# Patient Record
Sex: MALE | Race: WHITE | NOT HISPANIC OR LATINO | Employment: OTHER | URBAN - METROPOLITAN AREA
[De-identification: names, ages, dates, MRNs, and addresses within clinical notes are randomized per-mention and may not be internally consistent; named-entity substitution may affect disease eponyms.]

---

## 2023-05-17 ENCOUNTER — PATIENT OUTREACH (OUTPATIENT)
Dept: CARE COORDINATION | Facility: CLINIC | Age: 73
End: 2023-05-17
Payer: MEDICARE

## 2023-07-05 ENCOUNTER — TELEPHONE (OUTPATIENT)
Dept: PRIMARY CARE | Facility: CLINIC | Age: 73
End: 2023-07-05
Payer: MEDICARE

## 2023-07-05 DIAGNOSIS — R60.9 EDEMA, UNSPECIFIED TYPE: ICD-10-CM

## 2023-07-05 RX ORDER — CHLORTHALIDONE 25 MG/1
25 TABLET ORAL DAILY
COMMUNITY
Start: 2015-10-23 | End: 2023-07-05 | Stop reason: SDUPTHER

## 2023-07-05 RX ORDER — CHLORTHALIDONE 25 MG/1
25 TABLET ORAL DAILY
Qty: 90 TABLET | Refills: 1 | Status: SHIPPED | OUTPATIENT
Start: 2023-07-05 | End: 2023-10-11 | Stop reason: ALTCHOICE

## 2023-07-05 NOTE — TELEPHONE ENCOUNTER
Refills    Chlorthalidone 25 mg 1 daily  Completely out  He has an apt in 0ctober        90 days    [facility]   Please call patient once med has been sent

## 2023-09-21 NOTE — TELEPHONE ENCOUNTER
Received a rx request for niacin and pt is not on this rx vitamin  Pt needs an appt to follow up with his DM Management    I called and left him a msg to callus back to schedule  You can add him onto Dr Ford's schedule if he accepts

## 2023-09-22 ENCOUNTER — DOCUMENTATION (OUTPATIENT)
Dept: PRIMARY CARE | Facility: CLINIC | Age: 73
End: 2023-09-22
Payer: MEDICARE

## 2023-09-25 ENCOUNTER — HOSPITAL ENCOUNTER (OUTPATIENT)
Dept: DATA CONVERSION | Facility: HOSPITAL | Age: 73
End: 2023-09-25
Attending: INTERNAL MEDICINE | Admitting: INTERNAL MEDICINE
Payer: MEDICARE

## 2023-09-25 DIAGNOSIS — Z86.010 PERSONAL HISTORY OF COLONIC POLYPS: ICD-10-CM

## 2023-09-25 DIAGNOSIS — D12.2 BENIGN NEOPLASM OF ASCENDING COLON: ICD-10-CM

## 2023-09-25 DIAGNOSIS — Z12.11 ENCOUNTER FOR SCREENING FOR MALIGNANT NEOPLASM OF COLON: ICD-10-CM

## 2023-09-25 DIAGNOSIS — K64.0 FIRST DEGREE HEMORRHOIDS: ICD-10-CM

## 2023-09-25 DIAGNOSIS — D12.5 BENIGN NEOPLASM OF SIGMOID COLON: ICD-10-CM

## 2023-09-25 LAB — POCT GLUCOSE: 127 MG/DL (ref 74–99)

## 2023-09-29 LAB
COMPLETE PATHOLOGY REPORT: NORMAL
CONVERTED CLINICAL DIAGNOSIS-HISTORY: NORMAL
CONVERTED FINAL DIAGNOSIS: NORMAL
CONVERTED FINAL REPORT PDF LINK TO COPY AND PASTE: NORMAL
CONVERTED GROSS DESCRIPTION: NORMAL

## 2023-10-04 ENCOUNTER — TELEPHONE (OUTPATIENT)
Dept: PRIMARY CARE | Facility: CLINIC | Age: 73
End: 2023-10-04
Payer: MEDICARE

## 2023-10-10 ENCOUNTER — APPOINTMENT (OUTPATIENT)
Dept: PRIMARY CARE | Facility: CLINIC | Age: 73
End: 2023-10-10
Payer: MEDICARE

## 2023-10-11 ENCOUNTER — OFFICE VISIT (OUTPATIENT)
Dept: PRIMARY CARE | Facility: CLINIC | Age: 73
End: 2023-10-11
Payer: MEDICARE

## 2023-10-11 VITALS
SYSTOLIC BLOOD PRESSURE: 98 MMHG | DIASTOLIC BLOOD PRESSURE: 58 MMHG | TEMPERATURE: 97.3 F | WEIGHT: 191 LBS | BODY MASS INDEX: 28.29 KG/M2 | HEART RATE: 74 BPM | HEIGHT: 69 IN | OXYGEN SATURATION: 98 %

## 2023-10-11 DIAGNOSIS — E11.9 TYPE 2 DIABETES MELLITUS WITHOUT COMPLICATION, WITHOUT LONG-TERM CURRENT USE OF INSULIN (MULTI): ICD-10-CM

## 2023-10-11 DIAGNOSIS — J45.20 MILD INTERMITTENT ASTHMA, UNSPECIFIED WHETHER COMPLICATED (HHS-HCC): ICD-10-CM

## 2023-10-11 DIAGNOSIS — I48.91 ATRIAL FIBRILLATION, UNSPECIFIED TYPE (MULTI): ICD-10-CM

## 2023-10-11 DIAGNOSIS — Z00.00 WELLNESS EXAMINATION: Primary | ICD-10-CM

## 2023-10-11 DIAGNOSIS — E55.9 VITAMIN D DEFICIENCY: ICD-10-CM

## 2023-10-11 DIAGNOSIS — E78.5 HYPERLIPIDEMIA, UNSPECIFIED HYPERLIPIDEMIA TYPE: ICD-10-CM

## 2023-10-11 DIAGNOSIS — I10 HYPERTENSION, UNSPECIFIED TYPE: ICD-10-CM

## 2023-10-11 DIAGNOSIS — R97.20 ELEVATED PROSTATE SPECIFIC ANTIGEN LESS THAN 10 NG/ML: ICD-10-CM

## 2023-10-11 LAB — POC HEMOGLOBIN A1C: 7.8 % (ref 4.2–6.5)

## 2023-10-11 PROCEDURE — 1126F AMNT PAIN NOTED NONE PRSNT: CPT | Performed by: INTERNAL MEDICINE

## 2023-10-11 PROCEDURE — 1160F RVW MEDS BY RX/DR IN RCRD: CPT | Performed by: INTERNAL MEDICINE

## 2023-10-11 PROCEDURE — 83036 HEMOGLOBIN GLYCOSYLATED A1C: CPT | Performed by: INTERNAL MEDICINE

## 2023-10-11 PROCEDURE — 1159F MED LIST DOCD IN RCRD: CPT | Performed by: INTERNAL MEDICINE

## 2023-10-11 PROCEDURE — 99497 ADVNCD CARE PLAN 30 MIN: CPT | Performed by: INTERNAL MEDICINE

## 2023-10-11 PROCEDURE — 1036F TOBACCO NON-USER: CPT | Performed by: INTERNAL MEDICINE

## 2023-10-11 PROCEDURE — G0439 PPPS, SUBSEQ VISIT: HCPCS | Performed by: INTERNAL MEDICINE

## 2023-10-11 PROCEDURE — 3074F SYST BP LT 130 MM HG: CPT | Performed by: INTERNAL MEDICINE

## 2023-10-11 PROCEDURE — 4010F ACE/ARB THERAPY RXD/TAKEN: CPT | Performed by: INTERNAL MEDICINE

## 2023-10-11 PROCEDURE — 99397 PER PM REEVAL EST PAT 65+ YR: CPT | Performed by: INTERNAL MEDICINE

## 2023-10-11 PROCEDURE — 1170F FXNL STATUS ASSESSED: CPT | Performed by: INTERNAL MEDICINE

## 2023-10-11 PROCEDURE — 3078F DIAST BP <80 MM HG: CPT | Performed by: INTERNAL MEDICINE

## 2023-10-11 RX ORDER — ATORVASTATIN CALCIUM 20 MG/1
20 TABLET, FILM COATED ORAL DAILY
COMMUNITY
End: 2023-10-24 | Stop reason: SDUPTHER

## 2023-10-11 RX ORDER — FINASTERIDE 5 MG/1
1 TABLET, FILM COATED ORAL DAILY
COMMUNITY
Start: 2012-08-23 | End: 2023-10-24 | Stop reason: SDUPTHER

## 2023-10-11 RX ORDER — METFORMIN HYDROCHLORIDE 500 MG/1
1000 TABLET ORAL
COMMUNITY
End: 2023-10-11 | Stop reason: SDUPTHER

## 2023-10-11 RX ORDER — APIXABAN 5 MG/1
1 TABLET, FILM COATED ORAL 2 TIMES DAILY
COMMUNITY
Start: 2014-07-03 | End: 2023-10-24 | Stop reason: SDUPTHER

## 2023-10-11 RX ORDER — BLOOD SUGAR DIAGNOSTIC
STRIP MISCELLANEOUS
COMMUNITY
End: 2023-10-24 | Stop reason: SDUPTHER

## 2023-10-11 RX ORDER — LOSARTAN POTASSIUM 100 MG/1
1 TABLET ORAL DAILY
COMMUNITY
Start: 2013-01-28 | End: 2023-10-24 | Stop reason: SDUPTHER

## 2023-10-11 RX ORDER — METFORMIN HYDROCHLORIDE 1000 MG/1
1000 TABLET ORAL
Qty: 180 TABLET | Refills: 3 | Status: SHIPPED | OUTPATIENT
Start: 2023-10-11 | End: 2023-10-24 | Stop reason: SDUPTHER

## 2023-10-11 ASSESSMENT — PATIENT HEALTH QUESTIONNAIRE - PHQ9
2. FEELING DOWN, DEPRESSED OR HOPELESS: NOT AT ALL
1. LITTLE INTEREST OR PLEASURE IN DOING THINGS: NOT AT ALL
SUM OF ALL RESPONSES TO PHQ9 QUESTIONS 1 AND 2: 0
1. LITTLE INTEREST OR PLEASURE IN DOING THINGS: NOT AT ALL
SUM OF ALL RESPONSES TO PHQ9 QUESTIONS 1 AND 2: 0
2. FEELING DOWN, DEPRESSED OR HOPELESS: NOT AT ALL

## 2023-10-11 ASSESSMENT — ACTIVITIES OF DAILY LIVING (ADL)
TAKING_MEDICATION: INDEPENDENT
GROCERY_SHOPPING: INDEPENDENT
BATHING: INDEPENDENT
MANAGING_FINANCES: INDEPENDENT
DRESSING: INDEPENDENT
DOING_HOUSEWORK: INDEPENDENT

## 2023-10-11 NOTE — PROGRESS NOTES
"Subjective   Patient ID: Jett Shelton is a 73 y.o. male who presents for the following    MEDICARE [facility]LL[facility] PHYSICAL 10/11/2023  Germania rodrigues is his sister and POA   Assessment/Plan   A-fib: stable on eliquis 5mg bid patient follows with Dr Alejandra     Htn: stable   Chlorthalidone 25mg daily (stop given )   Losartan 100mg daily     Hld: stable on atorvastatin 20mg daily     DM2: stable. A1c 7.8 10/11/23  Morning sugars are  Jardiance 25mg daily   Metformin 1000mg daily     Asthma: stable,     Colonoscopy with Dr TYLER, 2022 3 YEAR FOLLOW UP (mother  of colon ca)    Prevnar 20 deferred    Dr Lim monitors PSA    Patient is full code at this time. Has living will. Needs to perhaps get a second POA after his sister.     Advanced care planning was discussed for 20 mins  DIscussed in details the options of advanced directives with patient in a voluntary nature. We discussed the differences between full code, comfort care arrest and comfort care. Patient was educated in detail regarding end-of-life care including options for hospice and palliative care. I provided details to patient regarding the importance of creating both a Living Will and Power of  documents and encouraged patient to complete these documents.      HPI  Male with htn, a-fib, hld, DM2 comes for the following    HTN: patient denies any headaches, blurred vision or dizziness. patient denies any stroke like symptoms    HLD: Patient denies any muscle aches and is tolerating statin therapy    Diabetes Type 2: patient denies any low blood sugars. Patient is following with opthalmology on a yearly basis. Patient is taking metformin and jardiance     social: patient denies any smoking, drug or alcohol abuse    family history:    surgical history:    Visit Vitals  Pulse 74   Temp 36.3 °C (97.3 °F)   Ht 1.753 m (5' 9\")   Wt 85 kg (187 lb 6.4 oz)   SpO2 98%   BMI 27.67 kg/m²   Smoking Status Former   BSA 2.03 m²     PHYSICAL EXAM "   General appearance: Alert and in no acute distress. speech is clear and coherent  HEENT: Sclera and conjunctiva white, EOMI, uvela midline, no mouth lesions. PERRLA,  nasal turbinates are not swollen without exudate. TM's Pichardo with cone of light, external ear canal with scant cerumen. No head trauma  Neck: no carotid bruits or thyromegaly. no lymphadenopathy   Respiratory : No respiratory distress, normal respiratory rhythm and effort. Clear bilateral breath sounds. No wheezing or rhonchi.   Cardiovascular: heart rate regular, S1, S2. no murmurs. no Lower extremity edema  Skin inspection: Normal skin color and pigmentation, normal skin turgor and no visible rash, induration, or cellulitis  MSK: 5/5 strength upper and lower extremities without gait abnormalities. no loss of muscle mass   Neuro: 2-12 CN grossly intact.  no slurred speech. no lateralizing deficit  Psychiatric Orientation: Oriented to person, place, and time. no depression, homicidal or suicidal thoughts, normal affect  Abdomen: soft, none tender, none distended. no organomegaly      REVIEW OF SYSTEMS   Constitutional: not feeling tired and no fever, chills or sweats. Denies weight loss    HEENT: no earache and no sore throat. no blurred vision and or double vision. no headache  Cardiovascular: no exertional chest pain, no palpitations, no lower extremity edema and no intermittent leg claudication.   Lungs: Denies shortness of breath, exertional dyspnea, wheezing  Gastrointestinal: no change in bowel habits, no diarrhea, no nausea, no vomiting and no abdominal pain. Denies Melena, brbpr or dark stool  Musculoskeletal: no myalgias, no muscle weakness and no limb swelling.   Skin: no rashes, no change in skin color and pigmentation and no skin lumps.   Neurological: no headaches, no seizures, no numbness, no lateralizing deficits and no fainting.   Psychiatric: no depression and no anxiety.   Urine: denies polyuria, hematuria, dysuria   Endocrine: no  cold intolerance, no heat intolerance      No Known Allergies    Current Outpatient Medications   Medication Sig Dispense Refill    atorvastatin (Lipitor) 20 mg tablet Take 1 tablet (20 mg) by mouth once daily. as directed      chlorthalidone (Hygroton) 25 mg tablet Take 1 tablet (25 mg) by mouth once daily. 90 tablet 1    Eliquis 5 mg tablet Take 1 tablet (5 mg) by mouth 2 times a day.      empagliflozin (Jardiance) 25 mg 1 tablet (25 mg).      finasteride (Proscar) 5 mg tablet Take 1 tablet (5 mg) by mouth once daily.      losartan (Cozaar) 100 mg tablet Take 1 tablet (100 mg) by mouth once daily.      metFORMIN (Glucophage) 500 mg tablet 2 tablets (1,000 mg) 2 times a day with meals.      OneTouch Ultra Test strip USE TO CHECK BLOOD SUGAR DAILY       No current facility-administered medications for this visit.       Objective     No visits with results within 4 Month(s) from this visit.   Latest known visit with results is:   Legacy Encounter on 10/17/2022   Component Date Value Ref Range Status    Color, Urine 10/17/2022 YELLOW  STRAW,YELLOW Final    Appearance, Urine 10/17/2022 CLEAR  CLEAR Final    Specific Gravity, Urine 10/17/2022 1.027  1.005 - 1.035 Final    pH, Urine 10/17/2022 6.0  5.0 - 8.0 Final    Protein, Urine 10/17/2022 NEGATIVE  NEGATIVE mg/dL Final    Glucose, Urine 10/17/2022 >=500 (3+) (A)  NEGATIVE mg/dL Final    Blood, Urine 10/17/2022 NEGATIVE  NEGATIVE Final    Ketones, Urine 10/17/2022 NEGATIVE  NEGATIVE mg/dL Final    Bilirubin, Urine 10/17/2022 NEGATIVE  NEGATIVE Final    Urobilinogen, Urine 10/17/2022 <2.0  0.0 - 1.9 mg/dL Final    Nitrite, Urine 10/17/2022 NEGATIVE  NEGATIVE Final    Leukocyte Esterase, Urine 10/17/2022 NEGATIVE  NEGATIVE Final    Cholesterol 10/17/2022 140  0 - 199 mg/dL Final    HDL 10/17/2022 44.2  mg/dL Final    Cholesterol/HDL Ratio 10/17/2022 3.2   Final    LDL 10/17/2022 51  0 - 99 mg/dL Final    VLDL 10/17/2022 45 (H)  0 - 40 mg/dL Final    Triglycerides  10/17/2022 223 (H)  0 - 149 mg/dL Final    Non HDL Cholesterol 10/17/2022 96  mg/dL Final    Hemoglobin A1C 10/17/2022 7.1 (A)  % Final    Estimated Average Glucose 10/17/2022 157  MG/DL Final    Glucose 10/17/2022 114 (H)  74 - 99 mg/dL Final    Sodium 10/17/2022 140  136 - 145 mmol/L Final    Potassium 10/17/2022 3.8  3.5 - 5.3 mmol/L Final    Chloride 10/17/2022 97 (L)  98 - 107 mmol/L Final    Bicarbonate 10/17/2022 34 (H)  21 - 32 mmol/L Final    Anion Gap 10/17/2022 13  10 - 20 mmol/L Final    Urea Nitrogen 10/17/2022 21  6 - 23 mg/dL Final    Creatinine 10/17/2022 0.93  0.50 - 1.30 mg/dL Final    GFR MALE 10/17/2022 86  >90 mL/min/1.73m2 Final    Calcium 10/17/2022 10.5  8.6 - 10.6 mg/dL Final       Radiology: Reviewed imaging in powerchart.  Colonoscopy    Result Date: 9/25/2023  Patient Name: Jett Shelton Procedure Date: 9/25/2023 12:00 PM MRN: [id number] Account Number: [id number] YOB: 1950 Admit Type: Outpatient Site: [facility] Endoscopy Room 1 Ethnicity: Not  or  Race: White Attending MD: Aakash Clark MD, [id number] Procedure:             Colonoscopy Indications:           Surveillance: Personal history of adenomatous polyps                        on last colonoscopy 3 years ago Patient Profile:       Refer to note in patient chart for documentation of                        history and physical. Providers:             Aakash Clark MD (Doctor), Fiordaliza Roy, RN (Nurse) ,                        Rupal Cabrera RN (Nurse) , Araceli Prieto, Technician Referring:             Usman Montalvo DO Medicines:             Monitored Anesthesia Care Complications:         No immediate complications. Estimated blood loss:                        Minimal. Procedure:             Pre-Anesthesia Assessment:                        - Prior to the procedure, a History and Physical was                        performed, and patient medications and allergies were                        reviewed. The patient  is competent. The risks and                        benefits of the procedure and the sedation options and                        risks were discussed with the patient. All questions                        were answered and informed consent was obtained.                        Patient identification and proposed procedure were                        verified by the physician, the nurse and the                        anesthetist in the pre-procedure area. Mental Status                        Examination: alert and oriented. Airway Examination:                        Mallampati Class II (the uvula but not tonsillar                        pillars visualized). Respiratory Examination: clear to                        auscultation. Prophylactic Antibiotics: The patient                        does not require prophylactic antibiotics. Prior                        Anticoagulants: The patient has taken Eliquis                        (apixaban), last dose was 2 days prior to procedure.                        ASA Grade Assessment: III - A patient with severe                        systemic disease. After reviewing the risks and                        benefits, the patient was deemed in satisfactory                        condition to undergo the procedure. The anesthesia                        plan was to use monitored anesthesia care (MAC).                        Immediately prior to administration of medications,                        the patient was re-assessed for adequacy to receive                        sedatives. The heart rate, respiratory rate, oxygen                        saturations, blood pressure, adequacy of pulmonary                        ventilation, and response to care were monitored                        throughout the procedure. The physical status of the                        patient was re-assessed after the procedure.                        After I obtained informed consent, the scope was                         passed under direct vision. Throughout the procedure,                        the patient's blood pressure, pulse, and oxygen                        saturations were monitored continuously. The adult                        colonoscope was introduced through the anus and                        advanced to the terminal ileum, with identification of                        the appendiceal orifice and IC valve. The colonoscopy                        was performed without difficulty. The patient                        tolerated the procedure well. The quality of the bowel                        preparation was evaluated using the BBPS ([place] Bowel                        Preparation Scale) with scores of: Right Colon = 2                        (minor amount of residual staining, small fragments of                        stool and/or opaque liquid, but mucosa seen well),                        Transverse Colon = 3 (entire mucosa seen well with no                        residual staining, small fragments of stool or opaque                        liquid) and Left Colon = 3 (entire mucosa seen well                        with no residual staining, small fragments of stool or                        opaque liquid). The total BBPS score equals 8. The                        quality of the bowel preparation was good. The                        terminal ileum, ileocecal valve, appendiceal orifice,                        and rectum were photographed. Findings:      The perianal and digital rectal examinations were normal.      Three sessile polyps were found in the transverse colon and ascending      colon. The polyps were 5 to 7 mm in size. These polyps were removed with      a cold snare. Resection and retrieval were complete. The pathology      specimen was placed into Bottle A. Estimated blood loss was minimal.      Two sessile polyps were found in the sigmoid colon. The polyps were 5 to      7 mm in size. These  polyps were removed with a cold snare. Resection and      retrieval were complete. The pathology specimen was placed into Bottle      B. Estimated blood loss was minimal.      Non-bleeding internal hemorrhoids were found during retroflexion. The      hemorrhoids were small and Grade I (internal hemorrhoids that do not      prolapse).      The exam was otherwise normal throughout the examined colon.      The terminal ileum appeared normal. Estimated Blood Loss:      Estimated blood loss was minimal. Impression:            - Three 5 to 7 mm polyps in the transverse colon and                        in the ascending colon, removed with a cold snare.                        Resected and retrieved.                        - Two 5 to 7 mm polyps in the sigmoid colon, removed                        with a cold snare. Resected and retrieved.                        - Non-bleeding internal hemorrhoids.                        - The examined portion of the ileum was normal. Recommendation:        - Patient has a contact number available for                        emergencies. The signs and symptoms of potential                        delayed complications were discussed with the patient.                        Return to normal activities tomorrow. Written                        discharge instructions were provided to the patient.                        - Resume previous diet.                        - Continue present medications.                        - Await pathology results.                        - Repeat colonoscopy in 3 years for surveillance.                        - Resume Eliquis (apixaban) at prior dose tomorrow. Procedure Code(s):     --- Professional ---                        59131, Colonoscopy, flexible; with removal of                        tumor(s), polyp(s), or other lesion(s) by snare                        technique                        --- Technical ---                        68892, Colonoscopy, flexible;  with removal of                        tumor(s), polyp(s), or other lesion(s) by snare                        technique Diagnosis Code(s):     --- Professional ---                        Z12.11, Encounter for screening for malignant neoplasm                        of colon                        Z86.010, Personal history of colonic polyps                        D12.3, Benign neoplasm of transverse colon (hepatic                        flexure or splenic flexure)                        D12.2, Benign neoplasm of ascending colon                        D12.5, Benign neoplasm of sigmoid colon                        K64.0, First degree hemorrhoids                        --- Technical ---                        Z12.11, Encounter for screening for malignant neoplasm                        of colon                        Z86.010, Personal history of colonic polyps                        D12.3, Benign neoplasm of transverse colon (hepatic                        flexure or splenic flexure)                        D12.2, Benign neoplasm of ascending colon                        D12.5, Benign neoplasm of sigmoid colon                        K64.0, First degree hemorrhoids CPT copyright 2021 [employer]. All rights reserved. The codes documented in this report are preliminary and upon  review may be revised to meet current compliance requirements. Attending Participation:      I personally performed the entire procedure. Aakash Clark MD 9/25/2023 1:09:51 PM This report has been signed electronically. Number of Addenda: 0 Note Initiated On: 9/25/2023 12:00 PM Scope Withdrawal Time 0 hours 11 minutes 17 seconds Total Procedure Duration Time 0 hours 13 minutes 35 seconds       No family history on file.  Social History     Socioeconomic History    Marital status: Single     Spouse name: None    Number of children: None    Years of education: None    Highest education level: None   Occupational History    None    Tobacco Use    Smoking status: Former     Types: Cigarettes     Quit date: 2013     Years since quitting: 10.7    Smokeless tobacco: Never   Substance and Sexual Activity    Alcohol use: Yes     Comment: occassionally    Drug use: Never    Sexual activity: None   Other Topics Concern    None   Social History Narrative    None     Social Determinants of Health     Financial Resource Strain: Not on file   Food Insecurity: Not on file   Transportation Needs: Not on file   Physical Activity: Not on file   Stress: Not on file   Social Connections: Not on file   Intimate Partner Violence: Not on file   Housing Stability: Not on file     Past Medical History:   Diagnosis Date    Disorder of the skin and subcutaneous tissue, unspecified 11/14/2017    Skin lesion of back    Essential (primary) hypertension 12/04/2013    Benign essential hypertension    Pain in unspecified foot 05/22/2015    Foot pain    Personal history of other diseases of male genital organs     History of benign prostatic hypertrophy    Personal history of other diseases of the circulatory system 09/28/2018    History of cardiomyopathy    Personal history of other endocrine, nutritional and metabolic disease     History of type 2 diabetes mellitus    Personal history of other endocrine, nutritional and metabolic disease     History of hyperlipidemia    Personal history of other specified conditions     History of headache    Strain of muscle, fascia and tendon of abdomen, initial encounter 05/25/2016    Abdominal muscle strain    Unspecified asthma, uncomplicated 01/20/2022    Asthma, allergic    Unspecified visual disturbance 05/22/2015    Vision changes     No past surgical history on file.    Charting was completed using voice recognition technology and may include unintended errors.

## 2023-10-24 DIAGNOSIS — I1A.0 RESISTANT HYPERTENSION: ICD-10-CM

## 2023-10-24 DIAGNOSIS — N40.0 BENIGN PROSTATIC HYPERPLASIA WITHOUT LOWER URINARY TRACT SYMPTOMS: Primary | ICD-10-CM

## 2023-10-24 DIAGNOSIS — E11.9 TYPE 2 DIABETES MELLITUS WITHOUT COMPLICATION, WITHOUT LONG-TERM CURRENT USE OF INSULIN (MULTI): ICD-10-CM

## 2023-10-24 DIAGNOSIS — E78.5 HYPERLIPIDEMIA, UNSPECIFIED HYPERLIPIDEMIA TYPE: ICD-10-CM

## 2023-10-24 RX ORDER — METFORMIN HYDROCHLORIDE 1000 MG/1
1000 TABLET ORAL
Qty: 180 TABLET | Refills: 3 | Status: SHIPPED | OUTPATIENT
Start: 2023-10-24

## 2023-10-24 RX ORDER — APIXABAN 5 MG/1
5 TABLET, FILM COATED ORAL 2 TIMES DAILY
Qty: 90 TABLET | Refills: 3 | Status: SHIPPED | OUTPATIENT
Start: 2023-10-24

## 2023-10-24 RX ORDER — FINASTERIDE 5 MG/1
5 TABLET, FILM COATED ORAL DAILY
Qty: 90 TABLET | Refills: 3 | Status: SHIPPED | OUTPATIENT
Start: 2023-10-24

## 2023-10-24 RX ORDER — ATORVASTATIN CALCIUM 20 MG/1
20 TABLET, FILM COATED ORAL DAILY
Qty: 90 TABLET | Refills: 3 | Status: SHIPPED | OUTPATIENT
Start: 2023-10-24

## 2023-10-24 RX ORDER — LOSARTAN POTASSIUM 100 MG/1
100 TABLET ORAL DAILY
Qty: 90 TABLET | Refills: 3 | Status: SHIPPED | OUTPATIENT
Start: 2023-10-24

## 2023-10-24 RX ORDER — BLOOD SUGAR DIAGNOSTIC
STRIP MISCELLANEOUS
Qty: 3 EACH | Refills: 3 | Status: SHIPPED | OUTPATIENT
Start: 2023-10-24

## 2023-11-20 ENCOUNTER — TELEPHONE (OUTPATIENT)
Dept: PRIMARY CARE | Facility: CLINIC | Age: 73
End: 2023-11-20
Payer: MEDICARE

## 2023-11-20 NOTE — TELEPHONE ENCOUNTER
Pt was getting an rx for metoprolol 25 mg from his previous pcp and ran out. Requesting a refill sent to [facility].

## 2023-11-20 NOTE — TELEPHONE ENCOUNTER
"It's not on his current or past med lists with us. He will need a virtual or in person appt for a \"new med.\" He cn make it with Jessica or Dr mera if Jessica is not avialble  "

## 2023-11-29 ENCOUNTER — OFFICE VISIT (OUTPATIENT)
Dept: PRIMARY CARE | Facility: CLINIC | Age: 73
End: 2023-11-29
Payer: MEDICARE

## 2023-11-29 VITALS
BODY MASS INDEX: 29.3 KG/M2 | WEIGHT: 197.8 LBS | SYSTOLIC BLOOD PRESSURE: 163 MMHG | DIASTOLIC BLOOD PRESSURE: 74 MMHG | HEART RATE: 65 BPM | OXYGEN SATURATION: 93 % | HEIGHT: 69 IN

## 2023-11-29 DIAGNOSIS — E11.9 TYPE 2 DIABETES MELLITUS WITHOUT COMPLICATION, WITHOUT LONG-TERM CURRENT USE OF INSULIN (MULTI): ICD-10-CM

## 2023-11-29 DIAGNOSIS — I10 HYPERTENSION, UNSPECIFIED TYPE: Primary | ICD-10-CM

## 2023-11-29 DIAGNOSIS — G62.9 NEUROPATHY: ICD-10-CM

## 2023-11-29 PROCEDURE — 1126F AMNT PAIN NOTED NONE PRSNT: CPT | Performed by: INTERNAL MEDICINE

## 2023-11-29 PROCEDURE — 99214 OFFICE O/P EST MOD 30 MIN: CPT | Performed by: INTERNAL MEDICINE

## 2023-11-29 PROCEDURE — 1036F TOBACCO NON-USER: CPT | Performed by: INTERNAL MEDICINE

## 2023-11-29 PROCEDURE — 3077F SYST BP >= 140 MM HG: CPT | Performed by: INTERNAL MEDICINE

## 2023-11-29 PROCEDURE — 1160F RVW MEDS BY RX/DR IN RCRD: CPT | Performed by: INTERNAL MEDICINE

## 2023-11-29 PROCEDURE — 1159F MED LIST DOCD IN RCRD: CPT | Performed by: INTERNAL MEDICINE

## 2023-11-29 PROCEDURE — 4010F ACE/ARB THERAPY RXD/TAKEN: CPT | Performed by: INTERNAL MEDICINE

## 2023-11-29 PROCEDURE — 3078F DIAST BP <80 MM HG: CPT | Performed by: INTERNAL MEDICINE

## 2023-11-29 RX ORDER — POTASSIUM CHLORIDE 600 MG/1
8 CAPSULE, EXTENDED RELEASE ORAL 2 TIMES DAILY
COMMUNITY
Start: 2023-10-23 | End: 2024-03-07 | Stop reason: SDUPTHER

## 2023-11-29 NOTE — PROGRESS NOTES
"Subjective   Patient ID: Jett Shelton is a 73 y.o. male who presents for the following    [facility] 10/11/2023  Germania rodrigues is his sister and POA   Assessment/Plan   A-fib: stable on eliquis 5mg bid patient follows with Dr Alejandra   Will hold on metoprolol (patient not taking it for several days and HR is in the 60s)    Htn: stable   Re-start Chlorthalidone @ 1/2 25mg daily (originally stopped given  at encounter 10/11/23) , patient may increase to full tablet if blood pressure still not at 130/80 goal    Continue on Losartan 100mg daily   Repeat bmp in a couple of week     Hld: stable on atorvastatin 20mg daily     DM2: stable. 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 Has living will. Needs to perhaps get a second POA after his sister.          HPI  Male with htn, a-fib, hld, DM2 comes for the following    HTN: patient denies any headaches, blurred vision or dizziness. patient denies any stroke like symptoms. Patient's bp is elevated    A-fib: patient was told to stop metoprolol by MA. Unclear why. Patient is doing well and HR in the 60s. No palpitations. He has not taken the medication for several days now.     HLD: Patient denies any muscle aches and is tolerating statin therapy    Diabetes Type 2: patient denies any low blood sugars. Patient is following with opthalmology on a yearly basis. Patient is taking metformin and jardiance     social: patient denies any smoking, drug or alcohol abuse       Visit Vitals  /74   Pulse 65   Ht 1.753 m (5' 9\")   Wt 89.7 kg (197 lb 12.8 oz)   SpO2 93%   BMI 29.21 kg/m²   Smoking Status Former   BSA 2.09 m²     PHYSICAL EXAM   General appearance: Alert and in no acute " distress. speech is clear and coherent  HEENT: Sclera and conjunctiva white, EOMI,     Respiratory : No respiratory distress, normal respiratory rhythm and effort. Clear bilateral breath sounds. No wheezing or rhonchi.   Cardiovascular: heart rate regular, S1, S2. no murmurs. no Lower extremity edema  Skin inspection: Normal skin color and pigmentation, normal skin turgor and no visible rash, induration, or cellulitis  MSK: 5/5 strength upper and lower extremities without gait abnormalities. no loss of muscle mass   Neuro: 2-12 CN grossly intact.  no slurred speech. no lateralizing deficit  Psychiatric Orientation: Oriented to person, place, and time. no depression, homicidal or suicidal thoughts, normal affect       REVIEW OF SYSTEMS   Constitutional: not feeling tired and no fever, chills or sweats. Denies weight loss, no headache  Cardiovascular: no exertional chest pain, no palpitations, no lower extremity edema    Lungs: Denies shortness of breath, exertional dyspnea, wheezing  Gastrointestinal: no change in bowel habits, no diarrhea, no nausea, no vomiting and no abdominal pain.   Musculoskeletal: no myalgias, no muscle weakness and no limb swelling.   Skin: no rashes, no change in skin color and pigmentation and no skin lumps.   Neurological: no headaches, no seizures, no numbness, no lateralizing deficits and no fainting.   Psychiatric: no depression and no anxiety.   Urine: denies polyuria, hematuria, dysuria        No Known Allergies    Current Outpatient Medications   Medication Sig Dispense Refill    atorvastatin (Lipitor) 20 mg tablet Take 1 tablet (20 mg) by mouth once daily. as directed 90 tablet 3    Eliquis 5 mg tablet Take 1 tablet (5 mg) by mouth 2 times a day. 90 tablet 3    empagliflozin (Jardiance) 25 mg Take 1 tablet (25 mg) by mouth once daily. 90 tablet 3    finasteride (Proscar) 5 mg tablet Take 1 tablet (5 mg) by mouth once daily. 90 tablet 3    losartan (Cozaar) 100 mg tablet Take 1  tablet (100 mg) by mouth once daily. 90 tablet 3    metFORMIN (Glucophage) 1,000 mg tablet Take 1 tablet (1,000 mg) by mouth 2 times a day with meals. 180 tablet 3    metoprolol succinate XL (Toprol-XL) 25 mg 24 hr tablet TAKE 1 TABLET BY MOUTH EVERY DAY 90 tablet 3    OneTouch Ultra Test strip USE TO CHECK BLOOD SUGAR DAILY 3 each 3    potassium chloride ER (Micro-K) 8 mEq ER capsule Take 1 capsule (8 mEq) by mouth 2 times a day.       No current facility-administered medications for this visit.       Objective     Office Visit on 10/11/2023   Component Date Value Ref Range Status    POC HEMOGLOBIN A1c 10/11/2023 7.8 (A)  4.2 - 6.5 % Final       Radiology: Reviewed imaging in powerchart.  Colonoscopy    Result Date: 9/25/2023  Patient Name: Jett Shelton Procedure Date: 9/25/2023 12:00 PM MRN: [id number] Account Number: [id number] YOB: 1950 Admit Type: Outpatient Site: [facility] Endoscopy Room 1 Ethnicity: Not  or  Race: White Attending MD: Aakash Clark MD, [id number] Procedure:             Colonoscopy Indications:           Surveillance: Personal history of adenomatous polyps                        on last colonoscopy 3 years ago Patient Profile:       Refer to note in patient chart for documentation of                        history and physical. Providers:             Aakash Clark MD (Doctor), Fiordaliza Roy RN (Nurse) ,                        Rupal Cabrera, GREGORIO (Nurse) , Araceli Prieto, Technician Referring:             Usman Montalvo DO Medicines:             Monitored Anesthesia Care Complications:         No immediate complications. Estimated blood loss:                        Minimal. Procedure:             Pre-Anesthesia Assessment:                        - Prior to the procedure, a History and Physical was                        performed, and patient medications and allergies were                        reviewed. The patient is competent. The risks and                        benefits of  the procedure and the sedation options and                        risks were discussed with the patient. All questions                        were answered and informed consent was obtained.                        Patient identification and proposed procedure were                        verified by the physician, the nurse and the                        anesthetist in the pre-procedure area. Mental Status                        Examination: alert and oriented. Airway Examination:                        Mallampati Class II (the uvula but not tonsillar                        pillars visualized). Respiratory Examination: clear to                        auscultation. Prophylactic Antibiotics: The patient                        does not require prophylactic antibiotics. Prior                        Anticoagulants: The patient has taken Eliquis                        (apixaban), last dose was 2 days prior to procedure.                        ASA Grade Assessment: III - A patient with severe                        systemic disease. After reviewing the risks and                        benefits, the patient was deemed in satisfactory                        condition to undergo the procedure. The anesthesia                        plan was to use monitored anesthesia care (MAC).                        Immediately prior to administration of medications,                        the patient was re-assessed for adequacy to receive                        sedatives. The heart rate, respiratory rate, oxygen                        saturations, blood pressure, adequacy of pulmonary                        ventilation, and response to care were monitored                        throughout the procedure. The physical status of the                        patient was re-assessed after the procedure.                        After I obtained informed consent, the scope was                        passed under direct vision. Throughout the  procedure,                        the patient's blood pressure, pulse, and oxygen                        saturations were monitored continuously. The adult                        colonoscope was introduced through the anus and                        advanced to the terminal ileum, with identification of                        the appendiceal orifice and IC valve. The colonoscopy                        was performed without difficulty. The patient                        tolerated the procedure well. The quality of the bowel                        preparation was evaluated using the BBPS (Prole Bowel                        Preparation Scale) with scores of: Right Colon = 2                        (minor amount of residual staining, small fragments of                        stool and/or opaque liquid, but mucosa seen well),                        Transverse Colon = 3 (entire mucosa seen well with no                        residual staining, small fragments of stool or opaque                        liquid) and Left Colon = 3 (entire mucosa seen well                        with no residual staining, small fragments of stool or                        opaque liquid). The total BBPS score equals 8. The                        quality of the bowel preparation was good. The                        terminal ileum, ileocecal valve, appendiceal orifice,                        and rectum were photographed. Findings:      The perianal and digital rectal examinations were normal.      Three sessile polyps were found in the transverse colon and ascending      colon. The polyps were 5 to 7 mm in size. These polyps were removed with      a cold snare. Resection and retrieval were complete. The pathology      specimen was placed into Bottle A. Estimated blood loss was minimal.      Two sessile polyps were found in the sigmoid colon. The polyps were 5 to      7 mm in size. These polyps were removed with a cold snare. Resection and       retrieval were complete. The pathology specimen was placed into Bottle      B. Estimated blood loss was minimal.      Non-bleeding internal hemorrhoids were found during retroflexion. The      hemorrhoids were small and Grade I (internal hemorrhoids that do not      prolapse).      The exam was otherwise normal throughout the examined colon.      The terminal ileum appeared normal. Estimated Blood Loss:      Estimated blood loss was minimal. Impression:            - Three 5 to 7 mm polyps in the transverse colon and                        in the ascending colon, removed with a cold snare.                        Resected and retrieved.                        - Two 5 to 7 mm polyps in the sigmoid colon, removed                        with a cold snare. Resected and retrieved.                        - Non-bleeding internal hemorrhoids.                        - The examined portion of the ileum was normal. Recommendation:        - Patient has a contact number available for                        emergencies. The signs and symptoms of potential                        delayed complications were discussed with the patient.                        Return to normal activities tomorrow. Written                        discharge instructions were provided to the patient.                        - Resume previous diet.                        - Continue present medications.                        - Await pathology results.                        - Repeat colonoscopy in 3 years for surveillance.                        - Resume Eliquis (apixaban) at prior dose tomorrow. Procedure Code(s):     --- Professional ---                        77211, Colonoscopy, flexible; with removal of                        tumor(s), polyp(s), or other lesion(s) by snare                        technique                        --- Technical ---                        45226, Colonoscopy, flexible; with removal of                        tumor(s), polyp(s),  or other lesion(s) by snare                        technique Diagnosis Code(s):     --- Professional ---                        Z12.11, Encounter for screening for malignant neoplasm                        of colon                        Z86.010, Personal history of colonic polyps                        D12.3, Benign neoplasm of transverse colon (hepatic                        flexure or splenic flexure)                        D12.2, Benign neoplasm of ascending colon                        D12.5, Benign neoplasm of sigmoid colon                        K64.0, First degree hemorrhoids                        --- Technical ---                        Z12.11, Encounter for screening for malignant neoplasm                        of colon                        Z86.010, Personal history of colonic polyps                        D12.3, Benign neoplasm of transverse colon (hepatic                        flexure or splenic flexure)                        D12.2, Benign neoplasm of ascending colon                        D12.5, Benign neoplasm of sigmoid colon                        K64.0, First degree hemorrhoids CPT copyright 2021 [employer]. All rights reserved. The codes documented in this report are preliminary and upon  review may be revised to meet current compliance requirements. Attending Participation:      I personally performed the entire procedure. Aakash Clark MD 9/25/2023 1:09:51 PM This report has been signed electronically. Number of Addenda: 0 Note Initiated On: 9/25/2023 12:00 PM Scope Withdrawal Time 0 hours 11 minutes 17 seconds Total Procedure Duration Time 0 hours 13 minutes 35 seconds       No family history on file.  Social History     Socioeconomic History    Marital status: Single     Spouse name: None    Number of children: None    Years of education: None    Highest education level: None   Occupational History    None   Tobacco Use    Smoking status: Former     Types: Cigarettes      Quit date: 2013     Years since quitting: 10.9    Smokeless tobacco: Never   Vaping Use    Vaping Use: Never used   Substance and Sexual Activity    Alcohol use: Yes     Comment: occassionally    Drug use: Never    Sexual activity: None   Other Topics Concern    None   Social History Narrative    None     Social Determinants of Health     Financial Resource Strain: Not on file   Food Insecurity: Not on file   Transportation Needs: Not on file   Physical Activity: Not on file   Stress: Not on file   Social Connections: Not on file   Intimate Partner Violence: Not on file   Housing Stability: Not on file     Past Medical History:   Diagnosis Date    Disorder of the skin and subcutaneous tissue, unspecified 11/14/2017    Skin lesion of back    Essential (primary) hypertension 12/04/2013    Benign essential hypertension    Pain in unspecified foot 05/22/2015    Foot pain    Personal history of other diseases of male genital organs     History of benign prostatic hypertrophy    Personal history of other diseases of the circulatory system 09/28/2018    History of cardiomyopathy    Personal history of other endocrine, nutritional and metabolic disease     History of type 2 diabetes mellitus    Personal history of other endocrine, nutritional and metabolic disease     History of hyperlipidemia    Personal history of other specified conditions     History of headache    Strain of muscle, fascia and tendon of abdomen, initial encounter 05/25/2016    Abdominal muscle strain    Unspecified asthma, uncomplicated 01/20/2022    Asthma, allergic    Unspecified visual disturbance 05/22/2015    Vision changes     No past surgical history on file.    Charting was completed using voice recognition technology and may include unintended errors.

## 2023-12-11 LAB
NON-UH HIE A/G RATIO: 1.4
NON-UH HIE ALB: 3.7 G/DL (ref 3.4–5)
NON-UH HIE ALK PHOS: 67 UNIT/L (ref 45–117)
NON-UH HIE BILIRUBIN, TOTAL: 0.7 MG/DL (ref 0.3–1.2)
NON-UH HIE BUN/CREAT RATIO: 18.9
NON-UH HIE BUN: 17 MG/DL (ref 9–23)
NON-UH HIE CALCIUM: 9.9 MG/DL (ref 8.7–10.4)
NON-UH HIE CALCULATED LDL CHOLESTEROL: 40 MG/DL (ref 60–130)
NON-UH HIE CALCULATED OSMOLALITY: 286 MOSM/KG (ref 275–295)
NON-UH HIE CHLORIDE: 103 MMOL/L (ref 98–107)
NON-UH HIE CHOLESTEROL: 117 MG/DL (ref 100–200)
NON-UH HIE CO2, VENOUS: 31 MMOL/L (ref 20–31)
NON-UH HIE CREATININE: 0.9 MG/DL (ref 0.6–1.1)
NON-UH HIE GFR AA: >60
NON-UH HIE GLOBULIN: 2.6 G/DL
NON-UH HIE GLOMERULAR FILTRATION RATE: >60 ML/MIN/1.73M?
NON-UH HIE GLUCOSE: 159 MG/DL (ref 74–106)
NON-UH HIE GOT: 13 UNIT/L (ref 15–37)
NON-UH HIE GPT: 15 UNIT/L (ref 10–49)
NON-UH HIE HCT: 52.2 % (ref 41–52)
NON-UH HIE HDL CHOLESTEROL: 43 MG/DL (ref 40–60)
NON-UH HIE HGB: 17.8 G/DL (ref 13.5–17.5)
NON-UH HIE INSTR WBC ND: 8.6
NON-UH HIE K: 3.6 MMOL/L (ref 3.5–5.1)
NON-UH HIE MCH: 31.6 PG (ref 27–34)
NON-UH HIE MCHC: 34.1 G/DL (ref 32–37)
NON-UH HIE MCV: 92.5 FL (ref 80–100)
NON-UH HIE MPV: 8.8 FL (ref 7.4–10.4)
NON-UH HIE NA: 141 MMOL/L (ref 135–145)
NON-UH HIE PLATELET: 167 X10 (ref 150–450)
NON-UH HIE RBC: 5.64 X10 (ref 4.7–6.1)
NON-UH HIE RDW: 13.4 % (ref 11.5–14.5)
NON-UH HIE TOTAL CHOL/HDL CHOL RATIO: 2.7
NON-UH HIE TOTAL PROTEIN: 6.3 G/DL (ref 5.7–8.2)
NON-UH HIE TRIGLYCERIDES: 170 MG/DL (ref 30–150)
NON-UH HIE TSH: 1.36 UIU/ML (ref 0.55–4.78)
NON-UH HIE VIT D 25: 51 NG/ML
NON-UH HIE WBC: 8.6 X10 (ref 4.5–11)

## 2024-01-08 LAB
NON-UH HIE A/G RATIO: 1.4
NON-UH HIE ALB: 3.4 G/DL (ref 3.4–5)
NON-UH HIE ALK PHOS: 59 UNIT/L (ref 45–117)
NON-UH HIE BILIRUBIN, TOTAL: 0.5 MG/DL (ref 0.3–1.2)
NON-UH HIE BUN/CREAT RATIO: 23.8
NON-UH HIE BUN: 19 MG/DL (ref 9–23)
NON-UH HIE CALCIUM: 9.7 MG/DL (ref 8.7–10.4)
NON-UH HIE CALCULATED LDL CHOLESTEROL: 57 MG/DL (ref 60–130)
NON-UH HIE CALCULATED OSMOLALITY: 286 MOSM/KG (ref 275–295)
NON-UH HIE CHLORIDE: 108 MMOL/L (ref 98–107)
NON-UH HIE CHOLESTEROL: 154 MG/DL (ref 100–200)
NON-UH HIE CO2, VENOUS: 28 MMOL/L (ref 20–31)
NON-UH HIE CREATININE: 0.8 MG/DL (ref 0.6–1.1)
NON-UH HIE GFR AA: >60
NON-UH HIE GLOBULIN: 2.5 G/DL
NON-UH HIE GLOMERULAR FILTRATION RATE: >60 ML/MIN/1.73M?
NON-UH HIE GLUCOSE: 107 MG/DL (ref 74–106)
NON-UH HIE GOT: 11 UNIT/L (ref 15–37)
NON-UH HIE GPT: 14 UNIT/L (ref 10–49)
NON-UH HIE HCT: 46.8 % (ref 41–52)
NON-UH HIE HDL CHOLESTEROL: 41 MG/DL (ref 40–60)
NON-UH HIE HGB: 16 G/DL (ref 13.5–17.5)
NON-UH HIE INSTR WBC ND: 7.8
NON-UH HIE K: 4 MMOL/L (ref 3.5–5.1)
NON-UH HIE MCH: 31.7 PG (ref 27–34)
NON-UH HIE MCHC: 34.2 G/DL (ref 32–37)
NON-UH HIE MCV: 92.5 FL (ref 80–100)
NON-UH HIE MPV: 8.8 FL (ref 7.4–10.4)
NON-UH HIE NA: 142 MMOL/L (ref 135–145)
NON-UH HIE PLATELET: 149 X10 (ref 150–450)
NON-UH HIE RBC: 5.07 X10 (ref 4.7–6.1)
NON-UH HIE RDW: 13.3 % (ref 11.5–14.5)
NON-UH HIE TOTAL CHOL/HDL CHOL RATIO: 3.8
NON-UH HIE TOTAL PROTEIN: 5.9 G/DL (ref 5.7–8.2)
NON-UH HIE TRIGLYCERIDES: 278 MG/DL (ref 30–150)
NON-UH HIE TSH: 1.52 UIU/ML (ref 0.55–4.78)
NON-UH HIE VIT D 25: 47 NG/ML
NON-UH HIE WBC: 7.8 X10 (ref 4.5–11)

## 2024-01-15 ENCOUNTER — OFFICE VISIT (OUTPATIENT)
Dept: PRIMARY CARE | Facility: CLINIC | Age: 74
End: 2024-01-15
Payer: MEDICARE

## 2024-01-15 VITALS
DIASTOLIC BLOOD PRESSURE: 80 MMHG | HEIGHT: 69 IN | BODY MASS INDEX: 28.14 KG/M2 | SYSTOLIC BLOOD PRESSURE: 134 MMHG | OXYGEN SATURATION: 98 % | HEART RATE: 61 BPM | WEIGHT: 190 LBS

## 2024-01-15 DIAGNOSIS — E78.5 HYPERLIPIDEMIA, UNSPECIFIED HYPERLIPIDEMIA TYPE: ICD-10-CM

## 2024-01-15 DIAGNOSIS — E55.9 VITAMIN D DEFICIENCY: ICD-10-CM

## 2024-01-15 DIAGNOSIS — I48.91 ATRIAL FIBRILLATION, UNSPECIFIED TYPE (MULTI): ICD-10-CM

## 2024-01-15 DIAGNOSIS — I10 HYPERTENSION, UNSPECIFIED TYPE: Primary | ICD-10-CM

## 2024-01-15 DIAGNOSIS — E11.9 TYPE 2 DIABETES MELLITUS WITHOUT COMPLICATION, WITHOUT LONG-TERM CURRENT USE OF INSULIN (MULTI): ICD-10-CM

## 2024-01-15 LAB — POC HEMOGLOBIN A1C: 6.8 % (ref 4.2–6.5)

## 2024-01-15 PROCEDURE — 1159F MED LIST DOCD IN RCRD: CPT | Performed by: INTERNAL MEDICINE

## 2024-01-15 PROCEDURE — 3079F DIAST BP 80-89 MM HG: CPT | Performed by: INTERNAL MEDICINE

## 2024-01-15 PROCEDURE — 3075F SYST BP GE 130 - 139MM HG: CPT | Performed by: INTERNAL MEDICINE

## 2024-01-15 PROCEDURE — 99214 OFFICE O/P EST MOD 30 MIN: CPT | Performed by: INTERNAL MEDICINE

## 2024-01-15 PROCEDURE — 1126F AMNT PAIN NOTED NONE PRSNT: CPT | Performed by: INTERNAL MEDICINE

## 2024-01-15 PROCEDURE — 4010F ACE/ARB THERAPY RXD/TAKEN: CPT | Performed by: INTERNAL MEDICINE

## 2024-01-15 PROCEDURE — 83036 HEMOGLOBIN GLYCOSYLATED A1C: CPT | Performed by: INTERNAL MEDICINE

## 2024-01-15 PROCEDURE — 1036F TOBACCO NON-USER: CPT | Performed by: INTERNAL MEDICINE

## 2024-01-15 RX ORDER — CHLORTHALIDONE 25 MG/1
25 TABLET ORAL DAILY
COMMUNITY

## 2024-01-15 NOTE — PROGRESS NOTES
"Subjective   Patient ID: Jett Shelton is a 73 y.o. male who presents for the following    Chronic medical issues    MEDICARE WELLNESS 10/11/23 AND PHYSICAL 10/11/2023  Germania rodrigues is his sister and POA   Assessment/Plan   A-fib: stable on eliquis 5mg bid patient follows with Dr Alejandra   Will hold on metoprolol (patient not taking it for several days and HR is in the 60s)    Htn: stable   Re-start Chlorthalidone @ 1/2 25mg daily (originally stopped given  at encounter 10/11/23) , patient may increase to full tablet if blood pressure still not at 130/80 goal    Continue on Losartan 100mg daily   Repeat bmp in a couple of week     Hld: stable on atorvastatin 20mg daily     DM2: stable. A1c 7.8 10/11/23 --> 6.8 1/15/24    Morning sugars are 70-80s  Jardiance 25mg daily   Metformin 1000mg bid     Neuropathy: needs diabetic shoes,     Asthma: stable,     Colonoscopy with Dr TYLER, 2022 3 YEAR FOLLOW UP (mother  of colon ca)    Prevnar 20 deferred    Dr Lim monitors PSA    Patient is full code at this time. Has living will. Needs to perhaps get a second POA after his sister.          HPI  Male with htn, a-fib, hld, DM2 comes for the following    HTN: patient denies any headaches, blurred vision or dizziness. patient denies any stroke like symptoms. Patient's bp is elevated    A-fib: patient was told to stop metoprolol by MA. Unclear why. Patient is doing well and HR in the 60s. No palpitations. He has not taken the medication for several days now.     HLD: Patient denies any muscle aches and is tolerating statin therapy    Diabetes Type 2: patient denies any low blood sugars. Patient is following with opthalmology on a yearly basis. Patient is taking metformin and jardiance     social: patient denies any smoking, drug or alcohol abuse       Visit Vitals  /80   Pulse 61   Ht 1.753 m (5' 9\")   Wt 86.2 kg (190 lb)   SpO2 98%   BMI 28.06 kg/m²   Smoking Status Former   BSA 2.05 m²     PHYSICAL EXAM "   General appearance: Alert and in no acute distress. speech is clear and coherent  HEENT: Sclera and conjunctiva white, EOMI,     Respiratory : No respiratory distress, normal respiratory rhythm and effort. Clear bilateral breath sounds. No wheezing or rhonchi.   Cardiovascular: heart rate regular, S1, S2. no murmurs. no Lower extremity edema  Skin inspection: Normal skin color and pigmentation, normal skin turgor and no visible rash, induration, or cellulitis  MSK: 5/5 strength upper and lower extremities without gait abnormalities. no loss of muscle mass   Neuro: 2-12 CN grossly intact.  no slurred speech. no lateralizing deficit  Psychiatric Orientation: Oriented to person, place, and time. no depression, homicidal or suicidal thoughts, normal affect       REVIEW OF SYSTEMS   Constitutional: not feeling tired and no fever, chills or sweats. Denies weight loss, no headache  Cardiovascular: no exertional chest pain, no palpitations, no lower extremity edema    Lungs: Denies shortness of breath, exertional dyspnea, wheezing  Gastrointestinal: no change in bowel habits, no diarrhea, no nausea, no vomiting and no abdominal pain.   Musculoskeletal: no myalgias, no muscle weakness and no limb swelling.   Skin: no rashes, no change in skin color and pigmentation and no skin lumps.   Neurological: no headaches, no seizures, no numbness, no lateralizing deficits and no fainting.   Psychiatric: no depression and no anxiety.   Urine: denies polyuria, hematuria, dysuria        No Known Allergies    Current Outpatient Medications   Medication Sig Dispense Refill    atorvastatin (Lipitor) 20 mg tablet Take 1 tablet (20 mg) by mouth once daily. as directed 90 tablet 3    chlorthalidone (Hygroton) 25 mg tablet Take 1 tablet (25 mg) by mouth once daily. Take 1/2 tablet once daily      Eliquis 5 mg tablet Take 1 tablet (5 mg) by mouth 2 times a day. 90 tablet 3    empagliflozin (Jardiance) 25 mg Take 1 tablet (25 mg) by mouth once  daily. 90 tablet 3    finasteride (Proscar) 5 mg tablet Take 1 tablet (5 mg) by mouth once daily. 90 tablet 3    losartan (Cozaar) 100 mg tablet Take 1 tablet (100 mg) by mouth once daily. 90 tablet 3    metFORMIN (Glucophage) 1,000 mg tablet Take 1 tablet (1,000 mg) by mouth 2 times a day with meals. 180 tablet 3    OneTouch Ultra Test strip USE TO CHECK BLOOD SUGAR DAILY 3 each 3    potassium chloride ER (Micro-K) 8 mEq ER capsule Take 1 capsule (8 mEq) by mouth 2 times a day.      metoprolol succinate XL (Toprol-XL) 25 mg 24 hr tablet TAKE 1 TABLET BY MOUTH EVERY DAY (Patient not taking: Reported on 1/15/2024) 90 tablet 3     No current facility-administered medications for this visit.       Objective     Orders Only on 01/08/2024   Component Date Value Ref Range Status    [facility] RDW 01/08/2024 13.3  11.5 - 14.5 % Final    [facility] HCT 01/08/2024 46.8  41.0 - 52.0 % Final    [facility] MPV 01/08/2024 8.8  7.4 - 10.4 fL Final    [facility] RBC 01/08/2024 5.07  4.70 - 6.10 x10 Final    [facility] Instr WBC ND 01/08/2024 7.8   Final    [facility] MCHC 01/08/2024 34.2  32.0 - 37.0 g/dL Final    [facility] Platelet 01/08/2024 149 (L)  150 - 450 x10 Final    [facility] MCV 01/08/2024 92.5  80.0 - 100.0 fL Final    [facility] HGB 01/08/2024 16.0  13.5 - 17.5 g/dL Final    [facility] WBC 01/08/2024 7.8  4.5 - 11.0 x10 Final    [facility] MCH 01/08/2024 31.7  27.0 - 34.0 pg Final    [facility] Vit D 25 01/08/2024 47  ng/mL Final    [facility] TSH 01/08/2024 1.52  0.55 - 4.78 uIU/ml Final    [facility] Total Protein 01/08/2024 5.9  5.7 - 8.2 g/dL Final    [facility] CO2, venous 01/08/2024 28.0  20.0 - 31.0 mmol/L Final    [facility] Glomerular Filtration R* 01/08/2024 >60  mL/min/1.73m? Final    [facility] Calculated Osmolality 01/08/2024 286  275 - 295 mOsm/kg Final    [facility] K 01/08/2024 4.0  3.5 - 5.1 mmol/L Final    [facility] Globulin 01/08/2024 2.5  g/dL Final    [facility] BUN 01/08/2024 19  9 - 23 mg/dL  Final    [facility] Calcium 01/08/2024 9.7  8.7 - 10.4 mg/dL Final    [facility] GOT 01/08/2024 11 (L)  15 - 37 unit/L Final    [facility] ALB 01/08/2024 3.4  3.4 - 5.0 g/dL Final    [facility] Glucose 01/08/2024 107 (H)  74 - 106 mg/dL Final    [facility] GFR AA 01/08/2024 >60   Final    [facility] Bilirubin, Total 01/08/2024 0.50  0.30 - 1.20 mg/dL Final    [facility] Chloride 01/08/2024 108 (H)  98 - 107 mmol/L Final    [facility] A/G Ratio 01/08/2024 1.4   Final    [facility] BUN/Creat Ratio 01/08/2024 23.8   Final    [facility] Na 01/08/2024 142  135 - 145 mmol/L Final    [facility] GPT 01/08/2024 14  10 - 49 unit/L Final    [facility] Alk Phos 01/08/2024 59  45 - 117 unit/L Final    [facility] Creatinine 01/08/2024 0.8  0.6 - 1.1 mg/dL Final    [facility] Total Chol/HDL Chol Rat* 01/08/2024 3.8   Final    [facility] Triglycerides 01/08/2024 278 (H)  30 - 150 mg/dL Final    [facility] Cholesterol 01/08/2024 154  100 - 200 mg/dL Final    [facility] Calculated LDL Choleste* 01/08/2024 57 (L)  60 - 130 mg/dL Final    [facility] HDL Cholesterol 01/08/2024 41  40 - 60 mg/dL Final   Orders Only on 12/11/2023   Component Date Value Ref Range Status    [facility] HCT 12/11/2023 52.2 (H)  41.0 - 52.0 % Final    [facility] RBC 12/11/2023 5.64  4.70 - 6.10 x10 Final    [facility] Platelet 12/11/2023 167  150 - 450 x10 Final    [facility] Instr WBC ND 12/11/2023 8.6   Final    [facility] MCHC 12/11/2023 34.1  32.0 - 37.0 g/dL Final    [facility] MCV 12/11/2023 92.5  80.0 - 100.0 fL Final    [facility] MPV 12/11/2023 8.8  7.4 - 10.4 fL Final    [facility] HGB 12/11/2023 17.8 (H)  13.5 - 17.5 g/dL Final    [facility] WBC 12/11/2023 8.6  4.5 - 11.0 x10 Final    [facility] RDW 12/11/2023 13.4  11.5 - 14.5 % Final    [facility] MCH 12/11/2023 31.6  27.0 - 34.0 pg Final    [facility] Vit D 25 12/11/2023 51  ng/mL Final    [facility] TSH 12/11/2023 1.36  0.55 - 4.78 uIU/ml Final    [facility] Glomerular Filtration R* 12/11/2023  >60  mL/min/1.73m? Final    [facility] Calculated Osmolality 12/11/2023 286  275 - 295 mOsm/kg Final    [facility] K 12/11/2023 3.6  3.5 - 5.1 mmol/L Final    [facility] Globulin 12/11/2023 2.6  g/dL Final    [facility] Calcium 12/11/2023 9.9  8.7 - 10.4 mg/dL Final    [facility] BUN 12/11/2023 17  9 - 23 mg/dL Final    [facility] GOT 12/11/2023 13 (L)  15 - 37 unit/L Final    [facility] ALB 12/11/2023 3.7  3.4 - 5.0 g/dL Final    [facility] Glucose 12/11/2023 159 (H)  74 - 106 mg/dL Final    [facility] GFR AA 12/11/2023 >60   Final    [facility] Bilirubin, Total 12/11/2023 0.70  0.30 - 1.20 mg/dL Final    [facility] Chloride 12/11/2023 103  98 - 107 mmol/L Final    [facility] A/G Ratio 12/11/2023 1.4   Final    [facility] BUN/Creat Ratio 12/11/2023 18.9   Final    [facility] Na 12/11/2023 141  135 - 145 mmol/L Final    [facility] GPT 12/11/2023 15  10 - 49 unit/L Final    [facility] Alk Phos 12/11/2023 67  45 - 117 unit/L Final    [facility] Creatinine 12/11/2023 0.9  0.6 - 1.1 mg/dL Final    [facility] Total Protein 12/11/2023 6.3  5.7 - 8.2 g/dL Final    [facility] CO2, venous 12/11/2023 31.0  20.0 - 31.0 mmol/L Final    [facility] Calculated LDL Choleste* 12/11/2023 40 (L)  60 - 130 mg/dL Final    [facility] HDL Cholesterol 12/11/2023 43  40 - 60 mg/dL Final    [facility] Cholesterol 12/11/2023 117  100 - 200 mg/dL Final    [facility] Total Chol/HDL Chol Rat* 12/11/2023 2.7   Final    [facility] Triglycerides 12/11/2023 170 (H)  30 - 150 mg/dL Final   Office Visit on 10/11/2023   Component Date Value Ref Range Status    POC HEMOGLOBIN A1c 10/11/2023 7.8 (A)  4.2 - 6.5 % Final       Radiology: Reviewed imaging in powerchart.  Colonoscopy    Result Date: 9/25/2023  Patient Name: Jett Shelton Procedure Date: 9/25/2023 12:00 PM MRN: [id number] Account Number: [id number] YOB: 1950 Admit Type: Outpatient Site: [facility] Endoscopy Room 1 Ethnicity: Not  or  Race: White Attending MD: Aakash Clark MD,  [id number] Procedure:             Colonoscopy Indications:           Surveillance: Personal history of adenomatous polyps                        on last colonoscopy 3 years ago Patient Profile:       Refer to note in patient chart for documentation of                        history and physical. Providers:             Aakash Clark MD (Doctor), Fiordaliza Roy, GREGORIO (Nurse) ,                        Rupal Cabrera, RN (Nurse) , Araceli Prieto, Technician Referring:             Usman Montalvo DO Medicines:             Monitored Anesthesia Care Complications:         No immediate complications. Estimated blood loss:                        Minimal. Procedure:             Pre-Anesthesia Assessment:                        - Prior to the procedure, a History and Physical was                        performed, and patient medications and allergies were                        reviewed. The patient is competent. The risks and                        benefits of the procedure and the sedation options and                        risks were discussed with the patient. All questions                        were answered and informed consent was obtained.                        Patient identification and proposed procedure were                        verified by the physician, the nurse and the                        anesthetist in the pre-procedure area. Mental Status                        Examination: alert and oriented. Airway Examination:                        Mallampati Class II (the uvula but not tonsillar                        pillars visualized). Respiratory Examination: clear to                        auscultation. Prophylactic Antibiotics: The patient                        does not require prophylactic antibiotics. Prior                        Anticoagulants: The patient has taken Eliquis                        (apixaban), last dose was 2 days prior to procedure.                        ASA Grade Assessment: III - A patient  with severe                        systemic disease. After reviewing the risks and                        benefits, the patient was deemed in satisfactory                        condition to undergo the procedure. The anesthesia                        plan was to use monitored anesthesia care (MAC).                        Immediately prior to administration of medications,                        the patient was re-assessed for adequacy to receive                        sedatives. The heart rate, respiratory rate, oxygen                        saturations, blood pressure, adequacy of pulmonary                        ventilation, and response to care were monitored                        throughout the procedure. The physical status of the                        patient was re-assessed after the procedure.                        After I obtained informed consent, the scope was                        passed under direct vision. Throughout the procedure,                        the patient's blood pressure, pulse, and oxygen                        saturations were monitored continuously. The adult                        colonoscope was introduced through the anus and                        advanced to the terminal ileum, with identification of                        the appendiceal orifice and IC valve. The colonoscopy                        was performed without difficulty. The patient                        tolerated the procedure well. The quality of the bowel                        preparation was evaluated using the BBPS ([place] Bowel                        Preparation Scale) with scores of: Right Colon = 2                        (minor amount of residual staining, small fragments of                        stool and/or opaque liquid, but mucosa seen well),                        Transverse Colon = 3 (entire mucosa seen well with no                        residual staining, small fragments of stool or opaque                         liquid) and Left Colon = 3 (entire mucosa seen well                        with no residual staining, small fragments of stool or                        opaque liquid). The total BBPS score equals 8. The                        quality of the bowel preparation was good. The                        terminal ileum, ileocecal valve, appendiceal orifice,                        and rectum were photographed. Findings:      The perianal and digital rectal examinations were normal.      Three sessile polyps were found in the transverse colon and ascending      colon. The polyps were 5 to 7 mm in size. These polyps were removed with      a cold snare. Resection and retrieval were complete. The pathology      specimen was placed into Bottle A. Estimated blood loss was minimal.      Two sessile polyps were found in the sigmoid colon. The polyps were 5 to      7 mm in size. These polyps were removed with a cold snare. Resection and      retrieval were complete. The pathology specimen was placed into Bottle      B. Estimated blood loss was minimal.      Non-bleeding internal hemorrhoids were found during retroflexion. The      hemorrhoids were small and Grade I (internal hemorrhoids that do not      prolapse).      The exam was otherwise normal throughout the examined colon.      The terminal ileum appeared normal. Estimated Blood Loss:      Estimated blood loss was minimal. Impression:            - Three 5 to 7 mm polyps in the transverse colon and                        in the ascending colon, removed with a cold snare.                        Resected and retrieved.                        - Two 5 to 7 mm polyps in the sigmoid colon, removed                        with a cold snare. Resected and retrieved.                        - Non-bleeding internal hemorrhoids.                        - The examined portion of the ileum was normal. Recommendation:        - Patient has a contact number available for                         emergencies. The signs and symptoms of potential                        delayed complications were discussed with the patient.                        Return to normal activities tomorrow. Written                        discharge instructions were provided to the patient.                        - Resume previous diet.                        - Continue present medications.                        - Await pathology results.                        - Repeat colonoscopy in 3 years for surveillance.                        - Resume Eliquis (apixaban) at prior dose tomorrow. Procedure Code(s):     --- Professional ---                        58463, Colonoscopy, flexible; with removal of                        tumor(s), polyp(s), or other lesion(s) by snare                        technique                        --- Technical ---                        08645, Colonoscopy, flexible; with removal of                        tumor(s), polyp(s), or other lesion(s) by snare                        technique Diagnosis Code(s):     --- Professional ---                        Z12.11, Encounter for screening for malignant neoplasm                        of colon                        Z86.010, Personal history of colonic polyps                        D12.3, Benign neoplasm of transverse colon (hepatic                        flexure or splenic flexure)                        D12.2, Benign neoplasm of ascending colon                        D12.5, Benign neoplasm of sigmoid colon                        K64.0, First degree hemorrhoids                        --- Technical ---                        Z12.11, Encounter for screening for malignant neoplasm                        of colon                        Z86.010, Personal history of colonic polyps                        D12.3, Benign neoplasm of transverse colon (hepatic                        flexure or splenic flexure)                        D12.2, Benign neoplasm of ascending colon                         D12.5, Benign neoplasm of sigmoid colon                        K64.0, First degree hemorrhoids CPT copyright  American Medical Association. All rights reserved. The codes documented in this report are preliminary and upon  review may be revised to meet current compliance requirements. Attending Participation:      I personally performed the entire procedure. Aakash Clark MD 2023 1:09:51 PM This report has been signed electronically. Number of Addenda: 0 Note Initiated On: 2023 12:00 PM Scope Withdrawal Time 0 hours 11 minutes 17 seconds Total Procedure Duration Time 0 hours 13 minutes 35 seconds       No family history on file.  Social History     Socioeconomic History    Marital status: Single     Spouse name: None    Number of children: None    Years of education: None    Highest education level: None   Occupational History    None   Tobacco Use    Smoking status: Former     Types: Cigarettes     Quit date:      Years since quittin.0    Smokeless tobacco: Never   Vaping Use    Vaping Use: Never used   Substance and Sexual Activity    Alcohol use: Yes     Comment: occassionally    Drug use: Never    Sexual activity: None   Other Topics Concern    None   Social History Narrative    None     Social Determinants of Health     Financial Resource Strain: Not on file   Food Insecurity: Not on file   Transportation Needs: Not on file   Physical Activity: Not on file   Stress: Not on file   Social Connections: Not on file   Intimate Partner Violence: Not on file   Housing Stability: Not on file     Past Medical History:   Diagnosis Date    Disorder of the skin and subcutaneous tissue, unspecified 2017    Skin lesion of back    Essential (primary) hypertension 2013    Benign essential hypertension    Pain in unspecified foot 2015    Foot pain    Personal history of other diseases of male genital organs     History of benign prostatic hypertrophy    Personal  history of other diseases of the circulatory system 09/28/2018    History of cardiomyopathy    Personal history of other endocrine, nutritional and metabolic disease     History of type 2 diabetes mellitus    Personal history of other endocrine, nutritional and metabolic disease     History of hyperlipidemia    Personal history of other specified conditions     History of headache    Strain of muscle, fascia and tendon of abdomen, initial encounter 05/25/2016    Abdominal muscle strain    Unspecified asthma, uncomplicated 01/20/2022    Asthma, allergic    Unspecified visual disturbance 05/22/2015    Vision changes     No past surgical history on file.    Charting was completed using voice recognition technology and may include unintended errors.

## 2024-03-07 ENCOUNTER — TELEPHONE (OUTPATIENT)
Dept: PRIMARY CARE | Facility: CLINIC | Age: 74
End: 2024-03-07
Payer: MEDICARE

## 2024-03-07 DIAGNOSIS — E87.6 HYPOKALEMIA: Primary | ICD-10-CM

## 2024-03-08 RX ORDER — POTASSIUM CHLORIDE 600 MG/1
8 CAPSULE, EXTENDED RELEASE ORAL 2 TIMES DAILY
Qty: 180 CAPSULE | Refills: 3 | Status: SHIPPED | OUTPATIENT
Start: 2024-03-08

## 2024-03-25 ENCOUNTER — CLINICAL SUPPORT (OUTPATIENT)
Dept: AUDIOLOGY | Facility: CLINIC | Age: 74
End: 2024-03-25
Payer: MEDICARE

## 2024-03-25 DIAGNOSIS — H90.3 ASYMMETRIC SNHL (SENSORINEURAL HEARING LOSS): ICD-10-CM

## 2024-03-25 DIAGNOSIS — H93.12 TINNITUS, LEFT: Primary | ICD-10-CM

## 2024-03-25 PROCEDURE — 92550 TYMPANOMETRY & REFLEX THRESH: CPT | Performed by: AUDIOLOGIST

## 2024-03-25 PROCEDURE — 92557 COMPREHENSIVE HEARING TEST: CPT | Performed by: AUDIOLOGIST

## 2024-03-25 NOTE — PROGRESS NOTES
"AUDIOLOGY ADULT AUDIOMETRIC EVALUATION      Name:  Jett Shelton  :  1950  Age:  73 y.o.  Date of Evaluation:  3/25/2024    HISTORY  Reason for visit:  hearing loss   Mr. Shelton is seen 3/25/2024 at the request of Venkat Andrews M.D. for an evaluation of hearing.      Chief complaint:    History of sudden left hearing loss    Hearing loss:   history of sudden left hearing loss with tinnitus, around 2022.  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    RESULTS   Otoscopic Evaluation: minimal cerumen bilaterally      Immittance Measures (226 Hz probe tone):   Tympanometry is consistent with normal middle ear pressure and normal tympanic membrane mobility bilaterally.       Ipsilateral acoustic reflexes (500-4000 Hz) are present for 500-2000 Hz bilaterally.    Test technique:  standard behavioral technique via insert earphones.  Reliability is good.    Pure Tone Audiometry:    Right ear:  Hearing sensitivity is in the normal hearing to mild hearing loss range.    Left ear: Hearing sensitivity is in the mild to moderately-severe hearing loss range   Note asymmetry (left worse than right) across the frequency range    Speech Audiometry:        Right Ear:  Speech Reception Threshold (SRT) was obtained at 15 dBHL                 Speech discrimination score was 100% in quiet when words were presented at 65 dBHL      Left Ear:  Speech Reception Threshold (SRT) was obtained at 35 dBHL                 Speech discrimination score was 100% in quiet when " words were presented at 80 dBHL    IMPRESSIONS:  In comparison with previous audiogram of 3/13/2023, here has been improvement in left speech discrimination score (note higher presentation level today).  Pure-tone audiometry is stable bilaterally.      Patient is expected to have communication difficulty in adverse listening environments.    Patient is expected to benefit from effective communication strategies and may additionally benefit from devices that improve the desired sound signal over that of background noise.        RECOMMENDATIONS  Continue with ENT follow-up with Venkat Andrews M.D. as indicated.    Reassess hearing in 1 year (or sooner if medically indicated or if there is a concern for a change in hearing).    Continue with medical follow-up as indicated.       PATIENT EDUCATION  Discussed results and recommendations with patient.  Questions were addressed and the patient was encouraged to contact our department should concerns arise.       KANU Rodriges, [facility]  Licensed Audiologist

## 2024-03-25 NOTE — Clinical Note
"Jese Banks,  your patient was seen today for audiometric evaluation. I have completed the note and the audiogram will be available in the \"media\" tab today or tomorrow.  Please do not hesitate to contact me with any questions or concerns.     Thank you, Theodora"

## 2024-05-22 PROBLEM — E11.9 DIABETES MELLITUS (MULTI): Status: RESOLVED | Noted: 2024-05-22 | Resolved: 2024-05-22

## 2024-05-22 PROBLEM — R06.02 SHORTNESS OF BREATH ON EXERTION: Status: ACTIVE | Noted: 2024-05-22

## 2024-05-22 PROBLEM — R07.81 RIB PAIN ON LEFT SIDE: Status: ACTIVE | Noted: 2024-05-22

## 2024-05-22 PROBLEM — I10 HYPERTENSION: Status: RESOLVED | Noted: 2023-10-11 | Resolved: 2024-05-22

## 2024-05-22 PROBLEM — H90.3 ASYMMETRICAL SENSORINEURAL HEARING LOSS: Status: ACTIVE | Noted: 2024-05-22

## 2024-05-22 PROBLEM — H91.22 SUDDEN LEFT HEARING LOSS: Status: ACTIVE | Noted: 2024-05-22

## 2024-05-22 PROBLEM — K63.5 COLON POLYPS: Status: ACTIVE | Noted: 2024-05-22

## 2024-05-22 PROBLEM — I51.9 HEART DISEASE: Status: ACTIVE | Noted: 2024-05-22

## 2024-05-22 PROBLEM — D36.7 BENIGN NEOPLASM OF OTHER SPECIFIED SITES: Status: ACTIVE | Noted: 2024-05-22

## 2024-05-22 PROBLEM — H61.23 EXCESSIVE CERUMEN IN BOTH EAR CANALS: Status: ACTIVE | Noted: 2024-05-22

## 2024-05-22 PROBLEM — H93.12 SUBJECTIVE TINNITUS, LEFT: Status: ACTIVE | Noted: 2024-05-22

## 2024-05-22 PROBLEM — N40.0 ENLARGED PROSTATE WITHOUT LOWER URINARY TRACT SYMPTOMS (LUTS): Status: ACTIVE | Noted: 2024-05-22

## 2024-05-22 PROBLEM — L03.213 PRESEPTAL CELLULITIS OF RIGHT EYE: Status: ACTIVE | Noted: 2023-05-15

## 2024-05-22 PROBLEM — E11.9 DIABETES MELLITUS (MULTI): Status: ACTIVE | Noted: 2024-05-22

## 2024-05-22 PROBLEM — J45.909 ASTHMA, ALLERGIC (HHS-HCC): Status: ACTIVE | Noted: 2024-05-22

## 2024-05-22 PROBLEM — I10 BENIGN ESSENTIAL HYPERTENSION: Status: ACTIVE | Noted: 2024-05-22

## 2024-05-28 LAB
NON-UH HIE A/G RATIO: 1.3
NON-UH HIE ALB: 3.7 G/DL (ref 3.4–5)
NON-UH HIE ALK PHOS: 67 UNIT/L (ref 45–117)
NON-UH HIE BILIRUBIN, TOTAL: 0.8 MG/DL (ref 0.3–1.2)
NON-UH HIE BUN/CREAT RATIO: 18.9
NON-UH HIE BUN: 17 MG/DL (ref 9–23)
NON-UH HIE CALCIUM: 9.8 MG/DL (ref 8.7–10.4)
NON-UH HIE CALCULATED LDL CHOLESTEROL: 62 MG/DL (ref 60–130)
NON-UH HIE CALCULATED OSMOLALITY: 283 MOSM/KG (ref 275–295)
NON-UH HIE CHLORIDE: 107 MMOL/L (ref 98–107)
NON-UH HIE CHOLESTEROL: 141 MG/DL (ref 100–200)
NON-UH HIE CO2, VENOUS: 28 MMOL/L (ref 20–31)
NON-UH HIE CREATININE: 0.9 MG/DL (ref 0.6–1.1)
NON-UH HIE GFR AA: >60
NON-UH HIE GLOBULIN: 2.8 G/DL
NON-UH HIE GLOMERULAR FILTRATION RATE: >60 ML/MIN/1.73M?
NON-UH HIE GLUCOSE: 105 MG/DL (ref 74–106)
NON-UH HIE GOT: 14 UNIT/L (ref 15–37)
NON-UH HIE GPT: 18 UNIT/L (ref 10–49)
NON-UH HIE HCT: 50.6 % (ref 41–52)
NON-UH HIE HDL CHOLESTEROL: 44 MG/DL (ref 40–60)
NON-UH HIE HGB A1C: 6.2 %
NON-UH HIE HGB: 17.3 G/DL (ref 13.5–17.5)
NON-UH HIE INSTR WBC ND: 9.3
NON-UH HIE K: 3.8 MMOL/L (ref 3.5–5.1)
NON-UH HIE MCH: 32 PG (ref 27–34)
NON-UH HIE MCHC: 34.1 G/DL (ref 32–37)
NON-UH HIE MCV: 93.9 FL (ref 80–100)
NON-UH HIE MPV: 9.3 FL (ref 7.4–10.4)
NON-UH HIE NA: 141 MMOL/L (ref 135–145)
NON-UH HIE PLATELET: 160 X10 (ref 150–450)
NON-UH HIE RBC: 5.39 X10 (ref 4.7–6.1)
NON-UH HIE RDW: 13.8 % (ref 11.5–14.5)
NON-UH HIE TOTAL CHOL/HDL CHOL RATIO: 3.2
NON-UH HIE TOTAL PROTEIN: 6.5 G/DL (ref 5.7–8.2)
NON-UH HIE TRIGLYCERIDES: 173 MG/DL (ref 30–150)
NON-UH HIE TSH: 1.47 UIU/ML (ref 0.55–4.78)
NON-UH HIE VIT D 25: 47 NG/ML
NON-UH HIE WBC: 9.3 X10 (ref 4.5–11)

## 2024-06-05 ENCOUNTER — OFFICE VISIT (OUTPATIENT)
Dept: PRIMARY CARE | Facility: CLINIC | Age: 74
End: 2024-06-05
Payer: MEDICARE

## 2024-06-05 VITALS
HEART RATE: 58 BPM | DIASTOLIC BLOOD PRESSURE: 73 MMHG | BODY MASS INDEX: 27.99 KG/M2 | WEIGHT: 189 LBS | HEIGHT: 69 IN | SYSTOLIC BLOOD PRESSURE: 130 MMHG | OXYGEN SATURATION: 93 %

## 2024-06-05 DIAGNOSIS — E78.5 HYPERLIPIDEMIA, UNSPECIFIED HYPERLIPIDEMIA TYPE: ICD-10-CM

## 2024-06-05 DIAGNOSIS — Z00.00 WELLNESS EXAMINATION: Primary | ICD-10-CM

## 2024-06-05 DIAGNOSIS — E11.69 TYPE 2 DIABETES MELLITUS WITH OTHER SPECIFIED COMPLICATION, WITHOUT LONG-TERM CURRENT USE OF INSULIN (MULTI): ICD-10-CM

## 2024-06-05 DIAGNOSIS — I48.91 ATRIAL FIBRILLATION, UNSPECIFIED TYPE (MULTI): ICD-10-CM

## 2024-06-05 PROCEDURE — 1160F RVW MEDS BY RX/DR IN RCRD: CPT | Performed by: INTERNAL MEDICINE

## 2024-06-05 PROCEDURE — 1159F MED LIST DOCD IN RCRD: CPT | Performed by: INTERNAL MEDICINE

## 2024-06-05 PROCEDURE — 1158F ADVNC CARE PLAN TLK DOCD: CPT | Performed by: INTERNAL MEDICINE

## 2024-06-05 PROCEDURE — G0439 PPPS, SUBSEQ VISIT: HCPCS | Performed by: INTERNAL MEDICINE

## 2024-06-05 PROCEDURE — 1036F TOBACCO NON-USER: CPT | Performed by: INTERNAL MEDICINE

## 2024-06-05 PROCEDURE — 3075F SYST BP GE 130 - 139MM HG: CPT | Performed by: INTERNAL MEDICINE

## 2024-06-05 PROCEDURE — 99214 OFFICE O/P EST MOD 30 MIN: CPT | Performed by: INTERNAL MEDICINE

## 2024-06-05 PROCEDURE — 1123F ACP DISCUSS/DSCN MKR DOCD: CPT | Performed by: INTERNAL MEDICINE

## 2024-06-05 PROCEDURE — 3078F DIAST BP <80 MM HG: CPT | Performed by: INTERNAL MEDICINE

## 2024-06-05 PROCEDURE — 1170F FXNL STATUS ASSESSED: CPT | Performed by: INTERNAL MEDICINE

## 2024-06-05 ASSESSMENT — ACTIVITIES OF DAILY LIVING (ADL)
DOING_HOUSEWORK: INDEPENDENT
GROCERY_SHOPPING: INDEPENDENT
TAKING_MEDICATION: INDEPENDENT
MANAGING_FINANCES: INDEPENDENT
DRESSING: INDEPENDENT
BATHING: INDEPENDENT

## 2024-06-05 ASSESSMENT — PATIENT HEALTH QUESTIONNAIRE - PHQ9
SUM OF ALL RESPONSES TO PHQ9 QUESTIONS 1 AND 2: 0
2. FEELING DOWN, DEPRESSED OR HOPELESS: NOT AT ALL
1. LITTLE INTEREST OR PLEASURE IN DOING THINGS: NOT AT ALL

## 2024-06-05 NOTE — PROGRESS NOTES
"Subjective   Patient ID: Jett Shelton is a 74 y.o. male who presents for the following    Chronic medical issues and MEDICARE WELLNESS 2024    AND PHYSICAL 10/11/2023  Germania rodrigues is his sister and POA   Assessment/Plan   A-fib: stable on eliquis 5mg bid patient follows with Dr Alejandra   Will hold on metoprolol (patient not taking it for several days and HR is in the 60s)    Htn: stable   Re-start Chlorthalidone @ 1/2 25mg daily (originally stopped given  at encounter 10/11/23) , patient may increase to full tablet if blood pressure still not at 130/80 goal    Continue on Losartan 100mg daily   Repeat bmp in a couple of week     Hld: stable on atorvastatin 20mg daily     DM2: stable. A1c 7.8 10/11/23 --> 6.8 1/15/24  --> 6.2  (24)  Morning sugars are 70-80s  Jardiance 25mg daily   Metformin 1000mg bid     Neuropathy: needs diabetic shoes,     Asthma: stable,     Colonoscopy with Dr TYLER, 2022 3 YEAR FOLLOW UP (mother  of colon ca)      Dr Lim monitors PSA    Patient is full code at this time. Has living will. Needs to perhaps get a second POA after his sister.          HPI  Male with htn, a-fib, hld, DM2 comes for the following    HTN: patient denies any headaches, blurred vision or dizziness. patient denies any stroke like symptoms. Patient's bp is elevated    A-fib: patient was told to stop metoprolol by MA. Unclear why. Patient is doing well and HR in the 60s. No palpitations. He has not taken the medication for several days now.     HLD: Patient denies any muscle aches and is tolerating statin therapy    Diabetes Type 2: patient denies any low blood sugars. Patient is following with opthalmology on a yearly basis. Patient is taking metformin and jardiance     social: patient denies any smoking, drug or alcohol abuse       Visit Vitals  /73 (BP Location: Left arm, Patient Position: Sitting, BP Cuff Size: Adult)   Pulse 58   Ht 1.753 m (5' 9\")   Wt 85.7 kg (189 lb)   SpO2 93% "   BMI 27.91 kg/m²   Smoking Status Former   BSA 2.04 m²     PHYSICAL EXAM   General appearance: Alert and in no acute distress. speech is clear and coherent  HEENT: Sclera and conjunctiva white, EOMI,     Respiratory : No respiratory distress, normal respiratory rhythm and effort. Clear bilateral breath sounds. No wheezing or rhonchi.   Cardiovascular: heart rate regular, S1, S2. no murmurs. no Lower extremity edema  Skin inspection: Normal skin color and pigmentation, normal skin turgor and no visible rash, induration, or cellulitis  MSK: 5/5 strength upper and lower extremities without gait abnormalities. no loss of muscle mass   Neuro: 2-12 CN grossly intact.  no slurred speech. no lateralizing deficit  Psychiatric Orientation: Oriented to person, place, and time. no depression, homicidal or suicidal thoughts, normal affect       REVIEW OF SYSTEMS   Constitutional: not feeling tired and no fever, chills or sweats. Denies weight loss, no headache  Cardiovascular: no exertional chest pain, no palpitations, no lower extremity edema    Lungs: Denies shortness of breath, exertional dyspnea, wheezing  Gastrointestinal: no change in bowel habits, no diarrhea, no nausea, no vomiting    Musculoskeletal: no myalgias, no muscle weakness and no limb swelling.   Skin: no rashes, no change in skin color and pigmentation and no skin lumps.   Neurological: no headaches, no seizures, no numbness, no lateralizing deficits    Psychiatric: no depression and no anxiety.   Urine: denies polyuria, hematuria, dysuria        No Known Allergies    Current Outpatient Medications   Medication Sig Dispense Refill    atorvastatin (Lipitor) 20 mg tablet Take 1 tablet (20 mg) by mouth once daily. as directed 90 tablet 3    chlorthalidone (Hygroton) 25 mg tablet Take 1 tablet (25 mg) by mouth once daily. Take 1/2 tablet once daily      Eliquis 5 mg tablet Take 1 tablet (5 mg) by mouth 2 times a day. 90 tablet 3    empagliflozin (Jardiance) 25 mg  Take 1 tablet (25 mg) by mouth once daily. 90 tablet 3    finasteride (Proscar) 5 mg tablet Take 1 tablet (5 mg) by mouth once daily. 90 tablet 3    losartan (Cozaar) 100 mg tablet Take 1 tablet (100 mg) by mouth once daily. 90 tablet 3    metFORMIN (Glucophage) 1,000 mg tablet Take 1 tablet (1,000 mg) by mouth 2 times a day with meals. 180 tablet 3    OneTouch Ultra Test strip USE TO CHECK BLOOD SUGAR DAILY 3 each 3    potassium chloride ER (Micro-K) 8 mEq ER capsule Take 1 capsule (8 mEq) by mouth 2 times a day. 180 capsule 3     No current facility-administered medications for this visit.       Objective     Orders Only on 05/28/2024   Component Date Value Ref Range Status    [facility] RBC 05/28/2024 5.39  4.70 - 6.10 x10 Final    [facility] Platelet 05/28/2024 160  150 - 450 x10 Final    [facility] MCHC 05/28/2024 34.1  32.0 - 37.0 g/dL Final    [facility] Instr WBC ND 05/28/2024 9.3   Final    [facility] MCV 05/28/2024 93.9  80.0 - 100.0 fL Final    [facility] HGB 05/28/2024 17.3  13.5 - 17.5 g/dL Final    [facility] MPV 05/28/2024 9.3  7.4 - 10.4 fL Final    [facility] WBC 05/28/2024 9.3  4.5 - 11.0 x10 Final    [facility] RDW 05/28/2024 13.8  11.5 - 14.5 % Final    [facility] MCH 05/28/2024 32.0  27.0 - 34.0 pg Final    [facility] HCT 05/28/2024 50.6  41.0 - 52.0 % Final    [facility] Globulin 05/28/2024 2.8  g/dL Final    [facility] Calcium 05/28/2024 9.8  8.7 - 10.4 mg/dL Final    [facility] BUN 05/28/2024 17  9 - 23 mg/dL Final    [facility] GOT 05/28/2024 14 (L)  15 - 37 unit/L Final    [facility] ALB 05/28/2024 3.7  3.4 - 5.0 g/dL Final    [facility] Glucose 05/28/2024 105  74 - 106 mg/dL Final    [facility] GFR AA 05/28/2024 >60   Final    [facility] Bilirubin, Total 05/28/2024 0.80  0.30 - 1.20 mg/dL Final    [facility] Chloride 05/28/2024 107  98 - 107 mmol/L Final    [facility] A/G Ratio 05/28/2024 1.3   Final    [facility] BUN/Creat Ratio 05/28/2024 18.9   Final    [facility] Na 05/28/2024 141  135  - 145 mmol/L Final    [facility] GPT 05/28/2024 18  10 - 49 unit/L Final    [facility] Alk Phos 05/28/2024 67  45 - 117 unit/L Final    [facility] Creatinine 05/28/2024 0.9  0.6 - 1.1 mg/dL Final    [facility] Total Protein 05/28/2024 6.5  5.7 - 8.2 g/dL Final    [facility] CO2, venous 05/28/2024 28.0  20.0 - 31.0 mmol/L Final    [facility] Glomerular Filtration R* 05/28/2024 >60  mL/min/1.73m? Final    [facility] Calculated Osmolality 05/28/2024 283  275 - 295 mOsm/kg Final    [facility] K 05/28/2024 3.8  3.5 - 5.1 mmol/L Final    [facility] Vit D 25 05/28/2024 47  ng/mL Final    [facility] TSH 05/28/2024 1.47  0.55 - 4.78 uIU/ml Final    [facility] Calculated LDL Choleste* 05/28/2024 62  60 - 130 mg/dL Final    [facility] HDL Cholesterol 05/28/2024 44  40 - 60 mg/dL Final    [facility] Total Chol/HDL Chol Rat* 05/28/2024 3.2   Final    [facility] Triglycerides 05/28/2024 173 (H)  30 - 150 mg/dL Final    [facility] Cholesterol 05/28/2024 141  100 - 200 mg/dL Final    [facility] HGB A1C 05/28/2024 6.2  % Final       Radiology: Reviewed imaging in powerchart.  Colonoscopy    Result Date: 9/25/2023  Patient Name: Jett Shelton Procedure Date: 9/25/2023 12:00 PM MRN: [id number] Account Number: [id number] YOB: 1950 Admit Type: Outpatient Site: [facility] Room 1 Ethnicity: Not  or  Race: White Attending MD: Aakash Clark MD, [id number] Procedure:             Colonoscopy Indications:           Surveillance: Personal history of adenomatous polyps                        on last colonoscopy 3 years ago Patient Profile:       Refer to note in patient chart for documentation of                        history and physical. Providers:             Aakash Clark MD (Doctor), Fiordaliza Roy, RN (Nurse) ,                        Rupal Cabrera, RN (Nurse) , Araceli Prieto, Technician Referring:             Usman Montalvo DO Medicines:             Monitored Anesthesia Care Complications:         No immediate  complications. Estimated blood loss:                        Minimal. Procedure:             Pre-Anesthesia Assessment:                        - Prior to the procedure, a History and Physical was                        performed, and patient medications and allergies were                        reviewed. The patient is competent. The risks and                        benefits of the procedure and the sedation options and                        risks were discussed with the patient. All questions                        were answered and informed consent was obtained.                        Patient identification and proposed procedure were                        verified by the physician, the nurse and the                        anesthetist in the pre-procedure area. Mental Status                        Examination: alert and oriented. Airway Examination:                        Mallampati Class II (the uvula but not tonsillar                        pillars visualized). Respiratory Examination: clear to                        auscultation. Prophylactic Antibiotics: The patient                        does not require prophylactic antibiotics. Prior                        Anticoagulants: The patient has taken Eliquis                        (apixaban), last dose was 2 days prior to procedure.                        ASA Grade Assessment: III - A patient with severe                        systemic disease. After reviewing the risks and                        benefits, the patient was deemed in satisfactory                        condition to undergo the procedure. The anesthesia                        plan was to use monitored anesthesia care (MAC).                        Immediately prior to administration of medications,                        the patient was re-assessed for adequacy to receive                        sedatives. The heart rate, respiratory rate, oxygen                        saturations, blood pressure,  adequacy of pulmonary                        ventilation, and response to care were monitored                        throughout the procedure. The physical status of the                        patient was re-assessed after the procedure.                        After I obtained informed consent, the scope was                        passed under direct vision. Throughout the procedure,                        the patient's blood pressure, pulse, and oxygen                        saturations were monitored continuously. The adult                        colonoscope was introduced through the anus and                        advanced to the terminal ileum, with identification of                        the appendiceal orifice and IC valve. The colonoscopy                        was performed without difficulty. The patient                        tolerated the procedure well. The quality of the bowel                        preparation was evaluated using the BBPS ([place] Bowel                        Preparation Scale) with scores of: Right Colon = 2                        (minor amount of residual staining, small fragments of                        stool and/or opaque liquid, but mucosa seen well),                        Transverse Colon = 3 (entire mucosa seen well with no                        residual staining, small fragments of stool or opaque                        liquid) and Left Colon = 3 (entire mucosa seen well                        with no residual staining, small fragments of stool or                        opaque liquid). The total BBPS score equals 8. The                        quality of the bowel preparation was good. The                        terminal ileum, ileocecal valve, appendiceal orifice,                        and rectum were photographed. Findings:      The perianal and digital rectal examinations were normal.      Three sessile polyps were found in the transverse colon and ascending      colon.  The polyps were 5 to 7 mm in size. These polyps were removed with      a cold snare. Resection and retrieval were complete. The pathology      specimen was placed into Bottle A. Estimated blood loss was minimal.      Two sessile polyps were found in the sigmoid colon. The polyps were 5 to      7 mm in size. These polyps were removed with a cold snare. Resection and      retrieval were complete. The pathology specimen was placed into Bottle      B. Estimated blood loss was minimal.      Non-bleeding internal hemorrhoids were found during retroflexion. The      hemorrhoids were small and Grade I (internal hemorrhoids that do not      prolapse).      The exam was otherwise normal throughout the examined colon.      The terminal ileum appeared normal. Estimated Blood Loss:      Estimated blood loss was minimal. Impression:            - Three 5 to 7 mm polyps in the transverse colon and                        in the ascending colon, removed with a cold snare.                        Resected and retrieved.                        - Two 5 to 7 mm polyps in the sigmoid colon, removed                        with a cold snare. Resected and retrieved.                        - Non-bleeding internal hemorrhoids.                        - The examined portion of the ileum was normal. Recommendation:        - Patient has a contact number available for                        emergencies. The signs and symptoms of potential                        delayed complications were discussed with the patient.                        Return to normal activities tomorrow. Written                        discharge instructions were provided to the patient.                        - Resume previous diet.                        - Continue present medications.                        - Await pathology results.                        - Repeat colonoscopy in 3 years for surveillance.                        - Resume Eliquis (apixaban) at prior dose  tomorrow. Procedure Code(s):     --- Professional ---                        55976, Colonoscopy, flexible; with removal of                        tumor(s), polyp(s), or other lesion(s) by snare                        technique                        --- Technical ---                        08111, Colonoscopy, flexible; with removal of                        tumor(s), polyp(s), or other lesion(s) by snare                        technique Diagnosis Code(s):     --- Professional ---                        Z12.11, Encounter for screening for malignant neoplasm                        of colon                        Z86.010, Personal history of colonic polyps                        D12.3, Benign neoplasm of transverse colon (hepatic                        flexure or splenic flexure)                        D12.2, Benign neoplasm of ascending colon                        D12.5, Benign neoplasm of sigmoid colon                        K64.0, First degree hemorrhoids                        --- Technical ---                        Z12.11, Encounter for screening for malignant neoplasm                        of colon                        Z86.010, Personal history of colonic polyps                        D12.3, Benign neoplasm of transverse colon (hepatic                        flexure or splenic flexure)                        D12.2, Benign neoplasm of ascending colon                        D12.5, Benign neoplasm of sigmoid colon                        K64.0, First degree hemorrhoids CPT copyright 2021 [employer]. All rights reserved. The codes documented in this report are preliminary and upon  review may be revised to meet current compliance requirements. Attending Participation:      I personally performed the entire procedure. Aakash Clark MD 9/25/2023 1:09:51 PM This report has been signed electronically. Number of Addenda: 0 Note Initiated On: 9/25/2023 12:00 PM Scope Withdrawal Time 0 hours 11  minutes 17 seconds Total Procedure Duration Time 0 hours 13 minutes 35 seconds       No family history on file.  Social History     Socioeconomic History    Marital status: Single     Spouse name: None    Number of children: None    Years of education: None    Highest education level: None   Occupational History    None   Tobacco Use    Smoking status: Former     Current packs/day: 0.00     Types: Cigarettes     Quit date:      Years since quittin.4    Smokeless tobacco: Never   Vaping Use    Vaping status: Never Used   Substance and Sexual Activity    Alcohol use: Yes     Comment: occassionally    Drug use: Never    Sexual activity: None   Other Topics Concern    None   Social History Narrative    None     Social Determinants of Health     Financial Resource Strain: Not on file   Food Insecurity: Not on file   Transportation Needs: Not on file   Physical Activity: Not on file   Stress: Not on file   Social Connections: Not on file   Intimate Partner Violence: Not on file   Housing Stability: Not on file     Past Medical History:   Diagnosis Date    Disorder of the skin and subcutaneous tissue, unspecified 2017    Skin lesion of back    Essential (primary) hypertension 2013    Benign essential hypertension    Pain in unspecified foot 2015    Foot pain    Personal history of other diseases of male genital organs     History of benign prostatic hypertrophy    Personal history of other diseases of the circulatory system 2018    History of cardiomyopathy    Personal history of other endocrine, nutritional and metabolic disease     History of type 2 diabetes mellitus    Personal history of other endocrine, nutritional and metabolic disease     History of hyperlipidemia    Personal history of other specified conditions     History of headache    Strain of muscle, fascia and tendon of abdomen, initial encounter 2016    Abdominal muscle strain    Unspecified asthma, uncomplicated  ([facility]) 01/20/2022    Asthma, allergic    Unspecified visual disturbance 05/22/2015    Vision changes     No past surgical history on file.    Charting was completed using voice recognition technology and may include unintended errors.

## 2024-06-17 ENCOUNTER — OFFICE VISIT (OUTPATIENT)
Dept: CARDIOLOGY | Facility: CLINIC | Age: 74
End: 2024-06-17
Payer: MEDICARE

## 2024-06-17 VITALS
WEIGHT: 179 LBS | HEIGHT: 69 IN | OXYGEN SATURATION: 93 % | BODY MASS INDEX: 26.51 KG/M2 | DIASTOLIC BLOOD PRESSURE: 80 MMHG | SYSTOLIC BLOOD PRESSURE: 120 MMHG | HEART RATE: 73 BPM

## 2024-06-17 DIAGNOSIS — I48.91 ATRIAL FIBRILLATION, UNSPECIFIED TYPE (MULTI): ICD-10-CM

## 2024-06-17 DIAGNOSIS — I48.0 PAROXYSMAL ATRIAL FIBRILLATION (MULTI): Primary | ICD-10-CM

## 2024-06-17 DIAGNOSIS — I08.0 MITRAL VALVE STENOSIS AND AORTIC VALVE STENOSIS: ICD-10-CM

## 2024-06-17 PROCEDURE — 93000 ELECTROCARDIOGRAM COMPLETE: CPT | Performed by: INTERNAL MEDICINE

## 2024-06-17 PROCEDURE — 3074F SYST BP LT 130 MM HG: CPT | Performed by: INTERNAL MEDICINE

## 2024-06-17 PROCEDURE — 1036F TOBACCO NON-USER: CPT | Performed by: INTERNAL MEDICINE

## 2024-06-17 PROCEDURE — 3079F DIAST BP 80-89 MM HG: CPT | Performed by: INTERNAL MEDICINE

## 2024-06-17 PROCEDURE — 1159F MED LIST DOCD IN RCRD: CPT | Performed by: INTERNAL MEDICINE

## 2024-06-17 PROCEDURE — 99213 OFFICE O/P EST LOW 20 MIN: CPT | Performed by: INTERNAL MEDICINE

## 2024-06-17 NOTE — ASSESSMENT & PLAN NOTE
1.  Paroxysmal atrial fibrillation: Jett is in sinus rhythm and has not had any symptomatic recurrences of atrial fibrillation.  Continue Eliquis for stroke risk reduction.    2.  Hypertension: Continue same antihypertensive medication regimen.    3.  Aortic valve stenosis: This has been mild to moderate based on the echocardiogram from July 2023.  This will be reevaluated next year.

## 2024-06-17 NOTE — PROGRESS NOTES
"History Of Present Illness:      This is a 74-year-old male with a history of paroxysmal atrial fibrillation.  Since last office visit, he lost hearing in his left ear.  He exercises 30 minutes/day with no limitations on a treadmill.  No other cardiac complaints at this time.    Review of Systems  Other review of systems negative     Last Recorded Vitals:      6/14/2023    11:08 AM 7/25/2023    11:03 AM 10/11/2023     1:09 PM 11/29/2023     4:03 PM 1/15/2024     1:22 PM 6/5/2024     1:57 PM 6/17/2024    11:35 AM   Vitals   Systolic 130 116 98 163 134 130 120   Diastolic 84 68 58 74 80 73 80   Heart Rate 64 55 74 65 61 58 73   Temp  36.7 °C (98 °F) 36.3 °C (97.3 °F)       Height (in) 1.765 m (5' 9.5\")  1.753 m (5' 9\") 1.753 m (5' 9\") 1.753 m (5' 9\") 1.753 m (5' 9\") 1.753 m (5' 9\")   Weight (lb) 198 194 191 197.8 190 189 179   BMI 28.82 kg/m2 28.24 kg/m2 28.21 kg/m2 29.21 kg/m2 28.06 kg/m2 27.91 kg/m2 26.43 kg/m2   BSA (m2) 2.1 m2 2.08 m2 2.05 m2 2.09 m2 2.05 m2 2.04 m2 1.99 m2   Visit Report   Report Report Report Report Report          Allergies:  Patient has no known allergies.    Outpatient Medications:  Current Outpatient Medications   Medication Instructions    atorvastatin (LIPITOR) 20 mg, oral, Daily, as directed    chlorthalidone (HYGROTON) 25 mg, oral, Daily, Take 1/2 tablet once daily    Eliquis 5 mg, oral, 2 times daily    empagliflozin (JARDIANCE) 25 mg, oral, Daily    finasteride (PROSCAR) 5 mg, oral, Daily    losartan (COZAAR) 100 mg, oral, Daily    metFORMIN (GLUCOPHAGE) 1,000 mg, oral, 2 times daily (morning and late afternoon)    OneTouch Ultra Test strip USE TO CHECK BLOOD SUGAR DAILY    potassium chloride ER (Micro-K) 8 mEq ER capsule 8 mEq, oral, 2 times daily       Physical Exam:    General Appearance:  Alert, oriented, no distress  Skin:  Warm and dry  Head and Neck:  No elevation of JVP, no carotid bruits  Cardiac Exam:  Rhythm is regular, S1 and S2 are normal, grade 1/6 crescendo decrescendo " murmur at the right upper sternal border  Lungs:  Clear to auscultation  Extremities:  no edema  Neurologic:  No focal deficits  Psychiatric:  Appropriate mood and behavior         Cardiology Tests:  I have personally review the diagnostic cardiac testing and my interpretation is as follows:    EKG:  NSR  Echocardiogram July 2023: Normal left ventricular function, peak instantaneous gradient across aortic valve 27.5 mmHg, mean gradient 15 mmHg      Assessment/Plan   Problem List Items Addressed This Visit             ICD-10-CM    Paroxysmal atrial fibrillation (Multi) - Primary I48.0     1.  Paroxysmal atrial fibrillation: Jett is in sinus rhythm and has not had any symptomatic recurrences of atrial fibrillation.  Continue Eliquis for stroke risk reduction.    2.  Hypertension: Continue same antihypertensive medication regimen.    3.  Aortic valve stenosis: This has been mild to moderate based on the echocardiogram from July 2023.  This will be reevaluated next year.         Mitral valve stenosis and aortic valve stenosis I08.0    Relevant Orders    Transthoracic echo (TTE) complete       James C Ramicone, DO

## 2024-08-10 DIAGNOSIS — I1A.0 RESISTANT HYPERTENSION: ICD-10-CM

## 2024-08-10 DIAGNOSIS — I48.0 PAROXYSMAL ATRIAL FIBRILLATION (MULTI): ICD-10-CM

## 2024-08-12 RX ORDER — APIXABAN 5 MG/1
5 TABLET, FILM COATED ORAL 2 TIMES DAILY
Qty: 60 TABLET | Refills: 11 | Status: SHIPPED | OUTPATIENT
Start: 2024-08-12

## 2024-08-13 ENCOUNTER — TELEPHONE (OUTPATIENT)
Dept: PRIMARY CARE | Facility: CLINIC | Age: 74
End: 2024-08-13
Payer: MEDICARE

## 2024-08-14 DIAGNOSIS — I10 BENIGN ESSENTIAL HYPERTENSION: Primary | ICD-10-CM

## 2024-08-14 RX ORDER — CHLORTHALIDONE 25 MG/1
12.5 TABLET ORAL DAILY
Qty: 45 TABLET | Refills: 3 | Status: SHIPPED | OUTPATIENT
Start: 2024-08-14

## 2024-09-27 DIAGNOSIS — I1A.0 RESISTANT HYPERTENSION: ICD-10-CM

## 2024-09-27 RX ORDER — LOSARTAN POTASSIUM 100 MG/1
100 TABLET ORAL DAILY
Qty: 90 TABLET | Refills: 3 | Status: SHIPPED | OUTPATIENT
Start: 2024-09-27

## 2024-10-31 DIAGNOSIS — E11.9 TYPE 2 DIABETES MELLITUS WITHOUT COMPLICATION, WITHOUT LONG-TERM CURRENT USE OF INSULIN (MULTI): ICD-10-CM

## 2024-10-31 RX ORDER — BLOOD SUGAR DIAGNOSTIC
STRIP MISCELLANEOUS
Qty: 100 EACH | Refills: 3 | Status: SHIPPED | OUTPATIENT
Start: 2024-10-31

## 2024-11-08 DIAGNOSIS — E11.9 TYPE 2 DIABETES MELLITUS WITHOUT COMPLICATION, WITHOUT LONG-TERM CURRENT USE OF INSULIN (MULTI): ICD-10-CM

## 2024-11-08 RX ORDER — METFORMIN HYDROCHLORIDE 1000 MG/1
1000 TABLET ORAL
Qty: 180 TABLET | Refills: 3 | Status: SHIPPED | OUTPATIENT
Start: 2024-11-08

## 2024-11-14 DIAGNOSIS — E11.9 TYPE 2 DIABETES MELLITUS WITHOUT COMPLICATION, WITHOUT LONG-TERM CURRENT USE OF INSULIN (MULTI): ICD-10-CM

## 2024-11-15 RX ORDER — EMPAGLIFLOZIN 25 MG/1
25 TABLET, FILM COATED ORAL DAILY
Qty: 90 TABLET | Refills: 3 | Status: SHIPPED | OUTPATIENT
Start: 2024-11-15

## 2024-12-09 ENCOUNTER — APPOINTMENT (OUTPATIENT)
Dept: PRIMARY CARE | Facility: CLINIC | Age: 74
End: 2024-12-09
Payer: MEDICARE

## 2024-12-09 VITALS
SYSTOLIC BLOOD PRESSURE: 131 MMHG | DIASTOLIC BLOOD PRESSURE: 72 MMHG | BODY MASS INDEX: 28.58 KG/M2 | WEIGHT: 193 LBS | OXYGEN SATURATION: 94 % | HEART RATE: 61 BPM | HEIGHT: 69 IN

## 2024-12-09 DIAGNOSIS — I10 BENIGN ESSENTIAL HYPERTENSION: ICD-10-CM

## 2024-12-09 DIAGNOSIS — I48.0 PAROXYSMAL ATRIAL FIBRILLATION (MULTI): ICD-10-CM

## 2024-12-09 DIAGNOSIS — Z00.00 ANNUAL PHYSICAL EXAM: Primary | ICD-10-CM

## 2024-12-09 DIAGNOSIS — E78.5 HYPERLIPIDEMIA, UNSPECIFIED HYPERLIPIDEMIA TYPE: ICD-10-CM

## 2024-12-09 DIAGNOSIS — R73.01 IMPAIRED FASTING GLUCOSE: ICD-10-CM

## 2024-12-09 DIAGNOSIS — Z12.5 PROSTATE CANCER SCREENING: ICD-10-CM

## 2024-12-09 LAB
NON-UH HIE A/G RATIO: 1.3
NON-UH HIE ALB: 3.6 G/DL (ref 3.4–5)
NON-UH HIE ALK PHOS: 66 UNIT/L (ref 45–117)
NON-UH HIE BILIRUBIN, TOTAL: 0.5 MG/DL (ref 0.3–1.2)
NON-UH HIE BUN/CREAT RATIO: 23.3
NON-UH HIE BUN: 21 MG/DL (ref 9–23)
NON-UH HIE CALCIUM: 10.1 MG/DL (ref 8.7–10.4)
NON-UH HIE CALCULATED LDL CHOLESTEROL: 30 MG/DL (ref 60–130)
NON-UH HIE CALCULATED OSMOLALITY: 290 MOSM/KG (ref 275–295)
NON-UH HIE CHLORIDE: 108 MMOL/L (ref 98–107)
NON-UH HIE CHOLESTEROL: 120 MG/DL (ref 100–200)
NON-UH HIE CO2, VENOUS: 28 MMOL/L (ref 20–31)
NON-UH HIE CREATININE: 0.9 MG/DL (ref 0.6–1.1)
NON-UH HIE GFR AA: >60
NON-UH HIE GLOBULIN: 2.8 G/DL
NON-UH HIE GLOMERULAR FILTRATION RATE: >60 ML/MIN/1.73M?
NON-UH HIE GLUCOSE: 131 MG/DL (ref 74–106)
NON-UH HIE GOT: 13 UNIT/L (ref 15–37)
NON-UH HIE GPT: 14 UNIT/L (ref 10–49)
NON-UH HIE HCT: 49.8 % (ref 41–52)
NON-UH HIE HDL CHOLESTEROL: 42 MG/DL (ref 40–60)
NON-UH HIE HGB A1C: 6.5 %
NON-UH HIE HGB: 17.1 G/DL (ref 13.5–17.5)
NON-UH HIE INSTR WBC ND: 9.3
NON-UH HIE K: 4 MMOL/L (ref 3.5–5.1)
NON-UH HIE MCH: 32.2 PG (ref 27–34)
NON-UH HIE MCHC: 34.3 G/DL (ref 32–37)
NON-UH HIE MCV: 94.1 FL (ref 80–100)
NON-UH HIE MPV: 8.6 FL (ref 7.4–10.4)
NON-UH HIE NA: 143 MMOL/L (ref 135–145)
NON-UH HIE PLATELET: 157 X10 (ref 150–450)
NON-UH HIE RBC: 5.29 X10 (ref 4.7–6.1)
NON-UH HIE RDW: 13.7 % (ref 11.5–14.5)
NON-UH HIE TOTAL CHOL/HDL CHOL RATIO: 2.9
NON-UH HIE TOTAL PROTEIN: 6.4 G/DL (ref 5.7–8.2)
NON-UH HIE TRIGLYCERIDES: 241 MG/DL (ref 30–150)
NON-UH HIE TSH: 1.01 UIU/ML (ref 0.55–4.78)
NON-UH HIE WBC: 9.3 X10 (ref 4.5–11)

## 2024-12-09 PROCEDURE — 3075F SYST BP GE 130 - 139MM HG: CPT | Performed by: INTERNAL MEDICINE

## 2024-12-09 PROCEDURE — 3078F DIAST BP <80 MM HG: CPT | Performed by: INTERNAL MEDICINE

## 2024-12-09 PROCEDURE — 1123F ACP DISCUSS/DSCN MKR DOCD: CPT | Performed by: INTERNAL MEDICINE

## 2024-12-09 PROCEDURE — 99397 PER PM REEVAL EST PAT 65+ YR: CPT | Performed by: INTERNAL MEDICINE

## 2024-12-09 PROCEDURE — 1036F TOBACCO NON-USER: CPT | Performed by: INTERNAL MEDICINE

## 2024-12-09 NOTE — PROGRESS NOTES
Subjective   Patient ID: Jett Shelton is a 74 y.o. male who presents for the following    PHYSICAL 24    MEDICARE WELLNESS 2024     Assessment/Plan   A-fib: stable on eliquis 5mg bid patient follows with Dr Alejandra   Will hold on metoprolol (patient not taking it for several days and HR is in the 60s)    Htn: stable   Re-start Chlorthalidone @ 1/2 25mg daily (originally stopped given /58 at encounter 10/11/23) , patient may increase to full tablet if blood pressure still not at 130/80 goal    Continue on Losartan 100mg daily   Repeat bmp in a couple of week     Hld: stable on atorvastatin 20mg daily     DM2: stable. A1c 7.8 10/11/23 --> 6.8 1/15/24  --> 6.2  (24)  Morning sugars are 70-80s  Jardiance 25mg daily   Metformin 1000mg bid     Neuropathy: needs diabetic shoes,     Asthma: stable,     Colonoscopy with Dr TYLER, 2022 3 YEAR FOLLOW UP (mother  of colon ca)    Dr Lim monitors PSA    Patient is full code at this time. Has living will. Needs to perhaps get a second POA after his sister.          HPI  Male with htn, a-fib, hld, DM2 comes for the following    HTN: patient denies any headaches, blurred vision or dizziness. patient denies any stroke like symptoms. Patient's bp is elevated    A-fib: patient was told to stop metoprolol by MA. Unclear why. Patient is doing well and HR in the 60s. No palpitations. He has not taken the medication for several days now.     HLD: Patient denies any muscle aches and is tolerating statin therapy    Diabetes Type 2: patient denies any low blood sugars. Patient is following with opthalmology on a yearly basis. Patient is taking metformin and jardiance     social: patient denies any smoking, drug or alcohol abuse       Germania rodrigues is his sister passed 2024      Visit Vitals  Smoking Status Former     PHYSICAL EXAM   General appearance: Alert and in no acute distress. speech is clear and coherent  HEENT: Sclera and conjunctiva white, EOMI,  No head trauma  Neck: no carotid bruits or thyromegaly. no lymphadenopathy   Respiratory : No respiratory distress, normal respiratory rhythm and effort. Clear bilateral breath sounds. No wheezing or rhonchi.   Cardiovascular: heart rate regular, S1, S2. no murmurs. no Lower extremity edema  Skin inspection: Normal skin color and pigmentation, normal skin turgor and no visible rash, induration, or cellulitis  MSK: 5/5 strength upper and lower extremities without gait abnormalities. no loss of muscle mass   Neuro: 2-12 CN grossly intact.  no slurred speech. no lateralizing deficit  Psychiatric Orientation: Oriented to person, place, and time. no depression, homicidal or suicidal thoughts, normal affect  Abdomen: soft, none tender, none distended. no organomegaly        REVIEW OF SYSTEMS   Constitutional: not feeling tired and no fever, chills or sweats. Denies weight loss, no headache  Cardiovascular: no exertional chest pain, no palpitations, no lower extremity edema    Lungs: Denies shortness of breath, exertional dyspnea, wheezing  Gastrointestinal: no change in bowel habits, no diarrhea, no nausea, no vomiting    Musculoskeletal: no myalgias, no muscle weakness and no limb swelling.   Skin: no rashes, no change in skin color and pigmentation and no skin lumps.   Neurological: no headaches, no seizures, no numbness, no lateralizing deficits    Psychiatric: no depression and no anxiety.   Urine: denies polyuria, hematuria, dysuria        No Known Allergies    Current Outpatient Medications   Medication Sig Dispense Refill    atorvastatin (Lipitor) 20 mg tablet Take 1 tablet (20 mg) by mouth once daily. as directed 90 tablet 3    blood sugar diagnostic (OneTouch Ultra Test) strip USE TO CHECK BLOOD SUGAR DAILY 100 each 3    chlorthalidone (Hygroton) 25 mg tablet Take 0.5 tablets (12.5 mg) by mouth once daily. Take 1/2 tablet once daily 45 tablet 3    Eliquis 5 mg tablet TAKE 1 TABLET BY MOUTH TWICE A DAY 60 tablet  11    finasteride (Proscar) 5 mg tablet Take 1 tablet (5 mg) by mouth once daily. 90 tablet 3    Jardiance 25 mg TAKE 1 TABLET BY MOUTH EVERY DAY 90 tablet 3    losartan (Cozaar) 100 mg tablet TAKE 1 TABLET BY MOUTH EVERY DAY 90 tablet 3    metFORMIN (Glucophage) 1,000 mg tablet TAKE 1 TABLET BY MOUTH TWICE A DAY WITH MEALS 180 tablet 3    potassium chloride ER (Micro-K) 8 mEq ER capsule Take 1 capsule (8 mEq) by mouth 2 times a day. 180 capsule 3     No current facility-administered medications for this visit.       Objective     No visits with results within 4 Month(s) from this visit.   Latest known visit with results is:   Orders Only on 05/28/2024   Component Date Value Ref Range Status    [facility] RBC 05/28/2024 5.39  4.70 - 6.10 x10 Final    [facility] Platelet 05/28/2024 160  150 - 450 x10 Final    [facility] MCHC 05/28/2024 34.1  32.0 - 37.0 g/dL Final    [facility] Instr WBC ND 05/28/2024 9.3   Final    [facility] MCV 05/28/2024 93.9  80.0 - 100.0 fL Final    [facility] HGB 05/28/2024 17.3  13.5 - 17.5 g/dL Final    [facility] MPV 05/28/2024 9.3  7.4 - 10.4 fL Final    [facility] WBC 05/28/2024 9.3  4.5 - 11.0 x10 Final    [facility] RDW 05/28/2024 13.8  11.5 - 14.5 % Final    [facility] MCH 05/28/2024 32.0  27.0 - 34.0 pg Final    [facility] HCT 05/28/2024 50.6  41.0 - 52.0 % Final    [facility] Globulin 05/28/2024 2.8  g/dL Final    [facility] Calcium 05/28/2024 9.8  8.7 - 10.4 mg/dL Final    [facility] BUN 05/28/2024 17  9 - 23 mg/dL Final    [facility] GOT 05/28/2024 14 (L)  15 - 37 unit/L Final    [facility] ALB 05/28/2024 3.7  3.4 - 5.0 g/dL Final    [facility] Glucose 05/28/2024 105  74 - 106 mg/dL Final    [facility] GFR AA 05/28/2024 >60   Final    [facility] Bilirubin, Total 05/28/2024 0.80  0.30 - 1.20 mg/dL Final    [facility] Chloride 05/28/2024 107  98 - 107 mmol/L Final    [facility] A/G Ratio 05/28/2024 1.3   Final    [facility] BUN/Creat Ratio 05/28/2024 18.9   Final    [facility] Na  05/28/2024 141  135 - 145 mmol/L Final    [facility] GPT 05/28/2024 18  10 - 49 unit/L Final    [facility] Alk Phos 05/28/2024 67  45 - 117 unit/L Final    [facility] Creatinine 05/28/2024 0.9  0.6 - 1.1 mg/dL Final    [facility] Total Protein 05/28/2024 6.5  5.7 - 8.2 g/dL Final    [facility] CO2, venous 05/28/2024 28.0  20.0 - 31.0 mmol/L Final    [facility] Glomerular Filtration R* 05/28/2024 >60  mL/min/1.73m? Final    [facility] Calculated Osmolality 05/28/2024 283  275 - 295 mOsm/kg Final    [facility] K 05/28/2024 3.8  3.5 - 5.1 mmol/L Final    [facility] Vit D 25 05/28/2024 47  ng/mL Final    [facility] TSH 05/28/2024 1.47  0.55 - 4.78 uIU/ml Final    [facility] Calculated LDL Choleste* 05/28/2024 62  60 - 130 mg/dL Final    [facility] HDL Cholesterol 05/28/2024 44  40 - 60 mg/dL Final    [facility] Total Chol/HDL Chol Rat* 05/28/2024 3.2   Final    [facility] Triglycerides 05/28/2024 173 (H)  30 - 150 mg/dL Final    [facility] Cholesterol 05/28/2024 141  100 - 200 mg/dL Final    [facility] HGB A1C 05/28/2024 6.2  % Final       Radiology: Reviewed imaging in powerchart.  Colonoscopy    Result Date: 9/25/2023  Patient Name: Jett Shelton Procedure Date: 9/25/2023 12:00 PM MRN: [id number] Account Number: [id number] YOB: 1950 Admit Type: Outpatient Site: [facility] Endoscopy Room 1 Ethnicity: Not  or  Race: White Attending MD: Aakash Clark MD, [id number] Procedure:             Colonoscopy Indications:           Surveillance: Personal history of adenomatous polyps                        on last colonoscopy 3 years ago Patient Profile:       Refer to note in patient chart for documentation of                        history and physical. Providers:             Aakash Clark MD (Doctor), Fiordaliza Roy, RN (Nurse) ,                        Rupal Cabrera, GREGORIO (Nurse) , Araceli Prieto, Technician Referring:             Usman Montalvo DO Medicines:             Monitored Anesthesia Care Complications:          No immediate complications. Estimated blood loss:                        Minimal. Procedure:             Pre-Anesthesia Assessment:                        - Prior to the procedure, a History and Physical was                        performed, and patient medications and allergies were                        reviewed. The patient is competent. The risks and                        benefits of the procedure and the sedation options and                        risks were discussed with the patient. All questions                        were answered and informed consent was obtained.                        Patient identification and proposed procedure were                        verified by the physician, the nurse and the                        anesthetist in the pre-procedure area. Mental Status                        Examination: alert and oriented. Airway Examination:                        Mallampati Class II (the uvula but not tonsillar                        pillars visualized). Respiratory Examination: clear to                        auscultation. Prophylactic Antibiotics: The patient                        does not require prophylactic antibiotics. Prior                        Anticoagulants: The patient has taken Eliquis                        (apixaban), last dose was 2 days prior to procedure.                        ASA Grade Assessment: III - A patient with severe                        systemic disease. After reviewing the risks and                        benefits, the patient was deemed in satisfactory                        condition to undergo the procedure. The anesthesia                        plan was to use monitored anesthesia care (MAC).                        Immediately prior to administration of medications,                        the patient was re-assessed for adequacy to receive                        sedatives. The heart rate, respiratory rate, oxygen                        saturations,  blood pressure, adequacy of pulmonary                        ventilation, and response to care were monitored                        throughout the procedure. The physical status of the                        patient was re-assessed after the procedure.                        After I obtained informed consent, the scope was                        passed under direct vision. Throughout the procedure,                        the patient's blood pressure, pulse, and oxygen                        saturations were monitored continuously. The adult                        colonoscope was introduced through the anus and                        advanced to the terminal ileum, with identification of                        the appendiceal orifice and IC valve. The colonoscopy                        was performed without difficulty. The patient                        tolerated the procedure well. The quality of the bowel                        preparation was evaluated using the BBPS (Inez Bowel                        Preparation Scale) with scores of: Right Colon = 2                        (minor amount of residual staining, small fragments of                        stool and/or opaque liquid, but mucosa seen well),                        Transverse Colon = 3 (entire mucosa seen well with no                        residual staining, small fragments of stool or opaque                        liquid) and Left Colon = 3 (entire mucosa seen well                        with no residual staining, small fragments of stool or                        opaque liquid). The total BBPS score equals 8. The                        quality of the bowel preparation was good. The                        terminal ileum, ileocecal valve, appendiceal orifice,                        and rectum were photographed. Findings:      The perianal and digital rectal examinations were normal.      Three sessile polyps were found in the transverse colon and  ascending      colon. The polyps were 5 to 7 mm in size. These polyps were removed with      a cold snare. Resection and retrieval were complete. The pathology      specimen was placed into Bottle A. Estimated blood loss was minimal.      Two sessile polyps were found in the sigmoid colon. The polyps were 5 to      7 mm in size. These polyps were removed with a cold snare. Resection and      retrieval were complete. The pathology specimen was placed into Bottle      B. Estimated blood loss was minimal.      Non-bleeding internal hemorrhoids were found during retroflexion. The      hemorrhoids were small and Grade I (internal hemorrhoids that do not      prolapse).      The exam was otherwise normal throughout the examined colon.      The terminal ileum appeared normal. Estimated Blood Loss:      Estimated blood loss was minimal. Impression:            - Three 5 to 7 mm polyps in the transverse colon and                        in the ascending colon, removed with a cold snare.                        Resected and retrieved.                        - Two 5 to 7 mm polyps in the sigmoid colon, removed                        with a cold snare. Resected and retrieved.                        - Non-bleeding internal hemorrhoids.                        - The examined portion of the ileum was normal. Recommendation:        - Patient has a contact number available for                        emergencies. The signs and symptoms of potential                        delayed complications were discussed with the patient.                        Return to normal activities tomorrow. Written                        discharge instructions were provided to the patient.                        - Resume previous diet.                        - Continue present medications.                        - Await pathology results.                        - Repeat colonoscopy in 3 years for surveillance.                        - Resume Eliquis (apixaban)  at prior dose tomorrow. Procedure Code(s):     --- Professional ---                        38361, Colonoscopy, flexible; with removal of                        tumor(s), polyp(s), or other lesion(s) by snare                        technique                        --- Technical ---                        84421, Colonoscopy, flexible; with removal of                        tumor(s), polyp(s), or other lesion(s) by snare                        technique Diagnosis Code(s):     --- Professional ---                        Z12.11, Encounter for screening for malignant neoplasm                        of colon                        Z86.010, Personal history of colonic polyps                        D12.3, Benign neoplasm of transverse colon (hepatic                        flexure or splenic flexure)                        D12.2, Benign neoplasm of ascending colon                        D12.5, Benign neoplasm of sigmoid colon                        K64.0, First degree hemorrhoids                        --- Technical ---                        Z12.11, Encounter for screening for malignant neoplasm                        of colon                        Z86.010, Personal history of colonic polyps                        D12.3, Benign neoplasm of transverse colon (hepatic                        flexure or splenic flexure)                        D12.2, Benign neoplasm of ascending colon                        D12.5, Benign neoplasm of sigmoid colon                        K64.0, First degree hemorrhoids CPT copyright 2021 [employer]. All rights reserved. The codes documented in this report are preliminary and upon  review may be revised to meet current compliance requirements. Attending Participation:      I personally performed the entire procedure. Aakash Clark MD 9/25/2023 1:09:51 PM This report has been signed electronically. Number of Addenda: 0 Note Initiated On: 9/25/2023 12:00 PM Scope Withdrawal Time 0  hours 11 minutes 17 seconds Total Procedure Duration Time 0 hours 13 minutes 35 seconds       No family history on file.  Social History     Socioeconomic History    Marital status: Single   Tobacco Use    Smoking status: Former     Current packs/day: 0.00     Types: Cigarettes     Quit date:      Years since quittin.9    Smokeless tobacco: Never   Vaping Use    Vaping status: Never Used   Substance and Sexual Activity    Alcohol use: Yes     Comment: occassionally    Drug use: Never     Past Medical History:   Diagnosis Date    Disorder of the skin and subcutaneous tissue, unspecified 2017    Skin lesion of back    Essential (primary) hypertension 2013    Benign essential hypertension    Pain in unspecified foot 2015    Foot pain    Personal history of other diseases of male genital organs     History of benign prostatic hypertrophy    Personal history of other diseases of the circulatory system 2018    History of cardiomyopathy    Personal history of other endocrine, nutritional and metabolic disease     History of type 2 diabetes mellitus    Personal history of other endocrine, nutritional and metabolic disease     History of hyperlipidemia    Personal history of other specified conditions     History of headache    Strain of muscle, fascia and tendon of abdomen, initial encounter 2016    Abdominal muscle strain    Unspecified asthma, uncomplicated ([facility]) 2022    Asthma, allergic    Unspecified visual disturbance 2015    Vision changes     No past surgical history on file.    Charting was completed using voice recognition technology and may include unintended errors.

## 2024-12-12 DIAGNOSIS — E87.6 HYPOKALEMIA: ICD-10-CM

## 2024-12-13 RX ORDER — POTASSIUM CHLORIDE 600 MG/1
8 CAPSULE, EXTENDED RELEASE ORAL 2 TIMES DAILY
Qty: 180 CAPSULE | Refills: 3 | Status: SHIPPED | OUTPATIENT
Start: 2024-12-13

## 2024-12-16 LAB
NON-UH HIE APPEARANCE, U: CLEAR
NON-UH HIE BILIRUBIN, U: NEGATIVE
NON-UH HIE BLOOD, U: ABNORMAL
NON-UH HIE COLOR, U: YELLOW
NON-UH HIE GLUCOSE QUAL, U: ABNORMAL
NON-UH HIE KETONES, U: ABNORMAL
NON-UH HIE LEUKOCYTE ESTERASE, U: NEGATIVE
NON-UH HIE MUCOUS, U: ABNORMAL
NON-UH HIE NITRITE, U: NEGATIVE
NON-UH HIE PH, U: 5 (ref 4.5–8)
NON-UH HIE PROSTATIC SPECIFIC ANTIGEN: 2 NG/ML (ref 0–4)
NON-UH HIE PROTEIN, U: NEGATIVE
NON-UH HIE RBC/HPF, U: 2 #/HPF (ref 0–3)
NON-UH HIE SPECIFIC GRAVITY, U: 1.04 (ref 1–1.03)
NON-UH HIE U MICRO: ABNORMAL
NON-UH HIE UROBILINOGEN QUAL, U: ABNORMAL
NON-UH HIE WBC/HPF, U: 1 #/HPF (ref 0–5)

## 2025-01-12 DIAGNOSIS — E78.5 HYPERLIPIDEMIA, UNSPECIFIED HYPERLIPIDEMIA TYPE: ICD-10-CM

## 2025-01-13 RX ORDER — ATORVASTATIN CALCIUM 20 MG/1
20 TABLET, FILM COATED ORAL DAILY
Qty: 90 TABLET | Refills: 3 | Status: SHIPPED | OUTPATIENT
Start: 2025-01-13

## 2025-05-09 ENCOUNTER — TELEPHONE (OUTPATIENT)
Dept: PRIMARY CARE | Facility: CLINIC | Age: 75
End: 2025-05-09
Payer: MEDICARE

## 2025-05-09 NOTE — TELEPHONE ENCOUNTER
PT FORGOT NAME OF GI. THINKS HE NEEDS COLONOSCOPY SOON  PT NOTICED A LUMP/BUMP EXTREME LOWER PART OF LARGE INSTESTINE. HAS APPT IN JUNE. SHOULD THERE BE ANY CONCERN?

## 2025-05-14 DIAGNOSIS — K63.5 POLYP OF COLON, UNSPECIFIED PART OF COLON, UNSPECIFIED TYPE: Primary | ICD-10-CM

## 2025-05-20 ENCOUNTER — TELEPHONE (OUTPATIENT)
Dept: GASTROENTEROLOGY | Facility: CLINIC | Age: 75
End: 2025-05-20
Payer: MEDICARE

## 2025-05-20 NOTE — TELEPHONE ENCOUNTER
Called patient to see if he wanted to schedule a colon consult due to age.  There is an active colonoscopy order in the chart.  Patient is currently out of the country for another week or so and will call us back upon his return to get a consult scheduled.  He is not with his calendar.

## 2025-05-27 PROBLEM — I05.0 MITRAL VALVE STENOSIS: Status: ACTIVE | Noted: 2024-06-17

## 2025-05-27 PROBLEM — I35.0 AORTIC VALVE STENOSIS: Status: ACTIVE | Noted: 2025-05-27

## 2025-05-27 PROBLEM — I35.0 NONRHEUMATIC AORTIC (VALVE) STENOSIS: Status: ACTIVE | Noted: 2025-05-27

## 2025-06-02 ENCOUNTER — HOSPITAL ENCOUNTER (OUTPATIENT)
Dept: CARDIOLOGY | Facility: CLINIC | Age: 75
Discharge: HOME | End: 2025-06-02
Payer: MEDICARE

## 2025-06-02 DIAGNOSIS — I08.0 MITRAL VALVE STENOSIS AND AORTIC VALVE STENOSIS: ICD-10-CM

## 2025-06-02 DIAGNOSIS — I35.0 NONRHEUMATIC AORTIC (VALVE) STENOSIS: ICD-10-CM

## 2025-06-02 PROCEDURE — 93306 TTE W/DOPPLER COMPLETE: CPT | Performed by: STUDENT IN AN ORGANIZED HEALTH CARE EDUCATION/TRAINING PROGRAM

## 2025-06-02 PROCEDURE — 93306 TTE W/DOPPLER COMPLETE: CPT

## 2025-06-03 LAB
AORTIC VALVE MEAN GRADIENT: 15 MMHG
AORTIC VALVE PEAK VELOCITY: 2.73 M/S
AV PEAK GRADIENT: 30 MMHG
AVA (PEAK VEL): 1.67 CM2
AVA (VTI): 1.95 CM2
EJECTION FRACTION APICAL 4 CHAMBER: 51.6
EJECTION FRACTION: 58 %
LEFT ATRIUM VOLUME AREA LENGTH INDEX BSA: 27.8 ML/M2
LEFT VENTRICLE INTERNAL DIMENSION DIASTOLE: 4.67 CM (ref 3.5–6)
LEFT VENTRICULAR OUTFLOW TRACT DIAMETER: 2.36 CM
MITRAL VALVE E/A RATIO: 0.66
NON-UH HIE A/G RATIO: 1.3
NON-UH HIE ALB: 3.8 G/DL (ref 3.4–5)
NON-UH HIE ALK PHOS: 57 UNIT/L (ref 45–117)
NON-UH HIE BILIRUBIN, TOTAL: 0.5 MG/DL (ref 0.3–1.2)
NON-UH HIE BUN/CREAT RATIO: 16
NON-UH HIE BUN: 16 MG/DL (ref 9–23)
NON-UH HIE CALCIUM: 10 MG/DL (ref 8.7–10.4)
NON-UH HIE CALCULATED LDL CHOLESTEROL: 38 MG/DL (ref 60–130)
NON-UH HIE CALCULATED OSMOLALITY: 289 MOSM/KG (ref 275–295)
NON-UH HIE CHLORIDE: 106 MMOL/L (ref 98–107)
NON-UH HIE CHOLESTEROL: 131 MG/DL (ref 100–200)
NON-UH HIE CO2, VENOUS: 27 MMOL/L (ref 20–31)
NON-UH HIE CREATININE: 1 MG/DL (ref 0.6–1.1)
NON-UH HIE GFR AA: >60
NON-UH HIE GLOBULIN: 3 G/DL
NON-UH HIE GLOMERULAR FILTRATION RATE: >60 ML/MIN/1.73M?
NON-UH HIE GLUCOSE: 182 MG/DL (ref 74–106)
NON-UH HIE GOT: 11 UNIT/L (ref 15–37)
NON-UH HIE GPT: 14 UNIT/L (ref 10–49)
NON-UH HIE HCT: 48 % (ref 41–52)
NON-UH HIE HDL CHOLESTEROL: 47 MG/DL (ref 40–60)
NON-UH HIE HGB A1C: 6.6 %
NON-UH HIE HGB: 16.6 G/DL (ref 13.5–17.5)
NON-UH HIE INSTR WBC ND: 8.1
NON-UH HIE K: 3.9 MMOL/L (ref 3.5–5.1)
NON-UH HIE MCH: 32.4 PG (ref 27–34)
NON-UH HIE MCHC: 34.5 G/DL (ref 32–37)
NON-UH HIE MCV: 93.9 FL (ref 80–100)
NON-UH HIE MPV: 8.9 FL (ref 7.4–10.4)
NON-UH HIE NA: 142 MMOL/L (ref 135–145)
NON-UH HIE PLATELET: 189 X10 (ref 150–450)
NON-UH HIE PROSTATIC SPECIFIC AG SCREEN: 2.6 NG/ML (ref 0–4)
NON-UH HIE RBC: 5.12 X10 (ref 4.7–6.1)
NON-UH HIE RDW: 13.4 % (ref 11.5–14.5)
NON-UH HIE TOTAL CHOL/HDL CHOL RATIO: 2.8
NON-UH HIE TOTAL PROTEIN: 6.8 G/DL (ref 5.7–8.2)
NON-UH HIE TRIGLYCERIDES: 231 MG/DL (ref 30–150)
NON-UH HIE TSH: 0.94 UIU/ML (ref 0.55–4.78)
NON-UH HIE WBC: 8.1 X10 (ref 4.5–11)
RIGHT VENTRICLE FREE WALL PEAK S': 12 CM/S
TRICUSPID ANNULAR PLANE SYSTOLIC EXCURSION: 2.4 CM

## 2025-06-09 ENCOUNTER — APPOINTMENT (OUTPATIENT)
Dept: PRIMARY CARE | Facility: CLINIC | Age: 75
End: 2025-06-09
Payer: MEDICARE

## 2025-06-09 VITALS
HEIGHT: 69 IN | WEIGHT: 188.2 LBS | OXYGEN SATURATION: 94 % | DIASTOLIC BLOOD PRESSURE: 78 MMHG | SYSTOLIC BLOOD PRESSURE: 131 MMHG | HEART RATE: 61 BPM | BODY MASS INDEX: 27.88 KG/M2

## 2025-06-09 DIAGNOSIS — E78.5 HYPERLIPIDEMIA, UNSPECIFIED HYPERLIPIDEMIA TYPE: ICD-10-CM

## 2025-06-09 DIAGNOSIS — I48.0 PAROXYSMAL ATRIAL FIBRILLATION (MULTI): ICD-10-CM

## 2025-06-09 DIAGNOSIS — Z00.00 WELLNESS EXAMINATION: Primary | ICD-10-CM

## 2025-06-09 DIAGNOSIS — E11.69 TYPE 2 DIABETES MELLITUS WITH OTHER SPECIFIED COMPLICATION, WITHOUT LONG-TERM CURRENT USE OF INSULIN: ICD-10-CM

## 2025-06-09 DIAGNOSIS — I10 BENIGN ESSENTIAL HYPERTENSION: ICD-10-CM

## 2025-06-09 PROCEDURE — 3078F DIAST BP <80 MM HG: CPT | Performed by: INTERNAL MEDICINE

## 2025-06-09 PROCEDURE — 99214 OFFICE O/P EST MOD 30 MIN: CPT | Performed by: INTERNAL MEDICINE

## 2025-06-09 PROCEDURE — 3075F SYST BP GE 130 - 139MM HG: CPT | Performed by: INTERNAL MEDICINE

## 2025-06-09 PROCEDURE — 1160F RVW MEDS BY RX/DR IN RCRD: CPT | Performed by: INTERNAL MEDICINE

## 2025-06-09 PROCEDURE — 1170F FXNL STATUS ASSESSED: CPT | Performed by: INTERNAL MEDICINE

## 2025-06-09 PROCEDURE — 1123F ACP DISCUSS/DSCN MKR DOCD: CPT | Performed by: INTERNAL MEDICINE

## 2025-06-09 PROCEDURE — G0439 PPPS, SUBSEQ VISIT: HCPCS | Performed by: INTERNAL MEDICINE

## 2025-06-09 PROCEDURE — 1159F MED LIST DOCD IN RCRD: CPT | Performed by: INTERNAL MEDICINE

## 2025-06-09 ASSESSMENT — ACTIVITIES OF DAILY LIVING (ADL)
MANAGING_FINANCES: INDEPENDENT
GROCERY_SHOPPING: INDEPENDENT
DOING_HOUSEWORK: INDEPENDENT
TAKING_MEDICATION: INDEPENDENT
DRESSING: INDEPENDENT
BATHING: INDEPENDENT

## 2025-06-09 NOTE — PROGRESS NOTES
Subjective   Patient ID: Jett Shelton is a 75 y.o. male who presents for the following    MEDICARE WELLNESS 2025 and chronic disease follow up    PHYSICAL 24  Assessment/Plan   A-fib: stable on eliquis 5mg bid patient follows with Dr Alejandra   Will hold on metoprolol (patient not taking it for several days and HR is in the 60s)    Htn: stable   Re-start Chlorthalidone @ 1/2 25mg daily (originally stopped given 98/58 at encounter 10/11/23) , patient may increase to full tablet if blood pressure still not at 130/80 goal    Continue on Losartan 100mg daily   Repeat bmp in a couple of week     Hld: stable on atorvastatin 20mg daily     DM2: stable. A1c 7.8 10/11/23 --> 6.8 1/15/24  --> 6.2  (24) --> 6.6 (25)  Morning sugars are 70-80s  Jardiance 25mg daily   Metformin 1000mg bid     Neuropathy: needs diabetic shoes,     Skin lesion on the back: refer to dermatology     Colonoscopy with Dr TYLER, 2022 3 YEAR FOLLOW UP (mother  of colon ca)    Dr Lim monitors PSA    Patient is full code at this time. Has living will. Needs to perhaps get a second POA after his sister.          HPI  Male with htn, a-fib, hld, DM2 comes for the following    HTN: patient denies any headaches, blurred vision or dizziness. patient denies any stroke like symptoms. Patient's bp is elevated    A-fib: patient was told to stop metoprolol by MA. Unclear why. Patient is doing well and HR in the 60s. No palpitations. He has not taken the medication for several days now.     HLD: Patient denies any muscle aches and is tolerating statin therapy    Diabetes Type 2: patient denies any low blood sugars. Patient is following with opthalmology on a yearly basis. Patient is taking metformin and jardiance     social: patient denies any smoking, drug or alcohol abuse       Germania rodrigues is his sister passed 2024      Visit Vitals  /78 (BP Location: Left arm, Patient Position: Sitting, BP Cuff Size: Adult)   Pulse 61  "  Ht 1.753 m (5' 9\")   Wt 85.4 kg (188 lb 3.2 oz)   SpO2 94%   BMI 27.79 kg/m²   Smoking Status Some Days   BSA 2.04 m²     PHYSICAL EXAM   General appearance: Alert and in no acute distress. speech is clear and coherent  HEENT: Sclera and conjunctiva white, EOMI, No head trauma    Respiratory : No respiratory distress, normal respiratory rhythm and effort. Clear bilateral breath sounds. No wheezing or rhonchi.   Cardiovascular: heart rate regular, S1, S2. no murmurs. no Lower extremity edema  Skin inspection: Normal skin color and pigmentation, normal skin turgor and no visible rash, induration, or cellulitis  MSK: 5/5 strength upper and lower extremities without gait abnormalities. no loss of muscle mass   Neuro: 2-12 CN grossly intact.  no slurred speech. no lateralizing deficit         REVIEW OF SYSTEMS   Constitutional: not feeling tired and no fever, chills or sweats. Denies weight loss, no headache  Cardiovascular: no exertional chest pain, no palpitations, no lower extremity edema    Lungs: Denies shortness of breath, exertional dyspnea, wheezing  Gastrointestinal: no change in bowel habits, no diarrhea, no nausea, no vomiting    Musculoskeletal: no myalgias, no muscle weakness and no limb swelling.    Neurological: no headaches, no seizures, no numbness, no lateralizing deficits    Psychiatric: no depression and no anxiety.   Urine: denies polyuria, hematuria, dysuria        No Known Allergies    Current Outpatient Medications   Medication Sig Dispense Refill    atorvastatin (Lipitor) 20 mg tablet TAKE 1 TABLET (20 MG) BY MOUTH ONCE DAILY. AS DIRECTED 90 tablet 3    blood sugar diagnostic (OneTouch Ultra Test) strip USE TO CHECK BLOOD SUGAR DAILY 100 each 3    chlorthalidone (Hygroton) 25 mg tablet Take 0.5 tablets (12.5 mg) by mouth once daily. Take 1/2 tablet once daily 45 tablet 3    Eliquis 5 mg tablet TAKE 1 TABLET BY MOUTH TWICE A DAY 60 tablet 11    finasteride (Proscar) 5 mg tablet Take 1 tablet (5 " mg) by mouth once daily. 90 tablet 3    Jardiance 25 mg TAKE 1 TABLET BY MOUTH EVERY DAY 90 tablet 3    losartan (Cozaar) 100 mg tablet TAKE 1 TABLET BY MOUTH EVERY DAY 90 tablet 3    metFORMIN (Glucophage) 1,000 mg tablet TAKE 1 TABLET BY MOUTH TWICE A DAY WITH MEALS 180 tablet 3    potassium chloride ER (Micro-K) 8 mEq ER capsule TAKE 1 CAPSULE (8 MEQ) BY MOUTH 2 TIMES A DAY. 180 capsule 3     No current facility-administered medications for this visit.       Objective     Orders Only on 06/03/2025   Component Date Value Ref Range Status    [facility] HCT 06/03/2025 48.0  41.0 - 52.0 % Final    [facility] Platelet 06/03/2025 189  150 - 450 x10 Final    [facility] RBC 06/03/2025 5.12  4.70 - 6.10 x10 Final    [facility] Instr WBC ND 06/03/2025 8.1   Final    [facility] MCHC 06/03/2025 34.5  32.0 - 37.0 g/dL Final    [facility] MCV 06/03/2025 93.9  80.0 - 100.0 fL Final    [facility] HGB 06/03/2025 16.6  13.5 - 17.5 g/dL Final    [facility] MPV 06/03/2025 8.9  7.4 - 10.4 fL Final    [facility] RDW 06/03/2025 13.4  11.5 - 14.5 % Final    [facility] WBC 06/03/2025 8.1  4.5 - 11.0 x10 Final    [facility] MCH 06/03/2025 32.4  27.0 - 34.0 pg Final    [facility] Prostatic Specific Ag S* 06/03/2025 2.6  0.0 - 4.0 ng/mL Final    [facility] Total Protein 06/03/2025 6.8  5.7 - 8.2 g/dL Final    [facility] CO2, venous 06/03/2025 27.0  20.0 - 31.0 mmol/L Final    [facility] Glomerular Filtration R* 06/03/2025 >60  mL/min/1.73m? Final    [facility] Calculated Osmolality 06/03/2025 289  275 - 295 mOsm/kg Final    [facility] K 06/03/2025 3.9  3.5 - 5.1 mmol/L Final    [facility] Globulin 06/03/2025 3.0  g/dL Final    [facility] Calcium 06/03/2025 10.0  8.7 - 10.4 mg/dL Final    [facility] BUN 06/03/2025 16  9 - 23 mg/dL Final    [facility] GOT 06/03/2025 11 (L)  15 - 37 unit/L Final    [facility] ALB 06/03/2025 3.8  3.4 - 5.0 g/dL Final    [facility] Glucose 06/03/2025 182 (H)  74 - 106 mg/dL Final    [facility] GFR AA 06/03/2025  >60   Final    [facility] Bilirubin, Total 06/03/2025 0.50  0.30 - 1.20 mg/dL Final    [facility] Chloride 06/03/2025 106  98 - 107 mmol/L Final    [facility] A/G Ratio 06/03/2025 1.3   Final    [facility] BUN/Creat Ratio 06/03/2025 16.0   Final    [facility] Na 06/03/2025 142  135 - 145 mmol/L Final    [facility] GPT 06/03/2025 14  10 - 49 unit/L Final    [facility] Alk Phos 06/03/2025 57  45 - 117 unit/L Final    [facility] Creatinine 06/03/2025 1.0  0.6 - 1.1 mg/dL Final    [facility] Total Chol/HDL Chol Rat* 06/03/2025 2.8   Final    [facility] Triglycerides 06/03/2025 231 (H)  30 - 150 mg/dL Final    [facility] Cholesterol 06/03/2025 131  100 - 200 mg/dL Final    [facility] Calculated LDL Choleste* 06/03/2025 38 (L)  60 - 130 mg/dL Final    [facility] HDL Cholesterol 06/03/2025 47  40 - 60 mg/dL Final    [facility] TSH 06/03/2025 0.94  0.55 - 4.78 uIU/ml Final    [facility] HGB A1C 06/03/2025 6.6  % Final   Hospital Outpatient Visit on 06/02/2025   Component Date Value Ref Range Status    AV pk [place] 06/02/2025 2.73  m/s Final    AV mn grad 06/02/2025 15  mmHg Final    LVOT diam 06/02/2025 2.36  cm Final    MV E/A ratio 06/02/2025 0.66   Final    LA vol index A/L 06/02/2025 27.8  ml/m2 Final    Tricuspid annular plane systolic e* 06/02/2025 2.4  cm Final    LV EF 06/02/2025 58  % Final    RV free wall pk S' 06/02/2025 12.00  cm/s Final    LVIDd 06/02/2025 4.67  cm Final    Aortic Valve Area by Continuity of* 06/02/2025 1.95  cm2 Final    Aortic Valve Area by Continuity of* 06/02/2025 1.67  cm2 Final    AV pk grad 06/02/2025 30  mmHg Final    LV A4C EF 06/02/2025 51.6   Final       Radiology: Reviewed imaging in powerchart.  Colonoscopy    Result Date: 9/25/2023  Patient Name: Jett Shelton Procedure Date: 9/25/2023 12:00 PM MRN: [id number] Account Number: [id number] YOB: 1950 Admit Type: Outpatient Site: [facility] Endoscopy Room 1 Ethnicity: Not  or  Race: White Attending MD: Aakash Clark MD,  [id number] Procedure:             Colonoscopy Indications:           Surveillance: Personal history of adenomatous polyps                        on last colonoscopy 3 years ago Patient Profile:       Refer to note in patient chart for documentation of                        history and physical. Providers:             Aakash Clark MD (Doctor), Fiordaliza Roy, GREGORIO (Nurse) ,                        Rupal Cabrera, RN (Nurse) , Araceli Prieto, Technician Referring:             Usman Montalvo DO Medicines:             Monitored Anesthesia Care Complications:         No immediate complications. Estimated blood loss:                        Minimal. Procedure:             Pre-Anesthesia Assessment:                        - Prior to the procedure, a History and Physical was                        performed, and patient medications and allergies were                        reviewed. The patient is competent. The risks and                        benefits of the procedure and the sedation options and                        risks were discussed with the patient. All questions                        were answered and informed consent was obtained.                        Patient identification and proposed procedure were                        verified by the physician, the nurse and the                        anesthetist in the pre-procedure area. Mental Status                        Examination: alert and oriented. Airway Examination:                        Mallampati Class II (the uvula but not tonsillar                        pillars visualized). Respiratory Examination: clear to                        auscultation. Prophylactic Antibiotics: The patient                        does not require prophylactic antibiotics. Prior                        Anticoagulants: The patient has taken Eliquis                        (apixaban), last dose was 2 days prior to procedure.                        ASA Grade Assessment: III - A patient  with severe                        systemic disease. After reviewing the risks and                        benefits, the patient was deemed in satisfactory                        condition to undergo the procedure. The anesthesia                        plan was to use monitored anesthesia care (MAC).                        Immediately prior to administration of medications,                        the patient was re-assessed for adequacy to receive                        sedatives. The heart rate, respiratory rate, oxygen                        saturations, blood pressure, adequacy of pulmonary                        ventilation, and response to care were monitored                        throughout the procedure. The physical status of the                        patient was re-assessed after the procedure.                        After I obtained informed consent, the scope was                        passed under direct vision. Throughout the procedure,                        the patient's blood pressure, pulse, and oxygen                        saturations were monitored continuously. The adult                        colonoscope was introduced through the anus and                        advanced to the terminal ileum, with identification of                        the appendiceal orifice and IC valve. The colonoscopy                        was performed without difficulty. The patient                        tolerated the procedure well. The quality of the bowel                        preparation was evaluated using the BBPS ([place] Bowel                        Preparation Scale) with scores of: Right Colon = 2                        (minor amount of residual staining, small fragments of                        stool and/or opaque liquid, but mucosa seen well),                        Transverse Colon = 3 (entire mucosa seen well with no                        residual staining, small fragments of stool or opaque                         liquid) and Left Colon = 3 (entire mucosa seen well                        with no residual staining, small fragments of stool or                        opaque liquid). The total BBPS score equals 8. The                        quality of the bowel preparation was good. The                        terminal ileum, ileocecal valve, appendiceal orifice,                        and rectum were photographed. Findings:      The perianal and digital rectal examinations were normal.      Three sessile polyps were found in the transverse colon and ascending      colon. The polyps were 5 to 7 mm in size. These polyps were removed with      a cold snare. Resection and retrieval were complete. The pathology      specimen was placed into Bottle A. Estimated blood loss was minimal.      Two sessile polyps were found in the sigmoid colon. The polyps were 5 to      7 mm in size. These polyps were removed with a cold snare. Resection and      retrieval were complete. The pathology specimen was placed into Bottle      B. Estimated blood loss was minimal.      Non-bleeding internal hemorrhoids were found during retroflexion. The      hemorrhoids were small and Grade I (internal hemorrhoids that do not      prolapse).      The exam was otherwise normal throughout the examined colon.      The terminal ileum appeared normal. Estimated Blood Loss:      Estimated blood loss was minimal. Impression:            - Three 5 to 7 mm polyps in the transverse colon and                        in the ascending colon, removed with a cold snare.                        Resected and retrieved.                        - Two 5 to 7 mm polyps in the sigmoid colon, removed                        with a cold snare. Resected and retrieved.                        - Non-bleeding internal hemorrhoids.                        - The examined portion of the ileum was normal. Recommendation:        - Patient has a contact number available for                         emergencies. The signs and symptoms of potential                        delayed complications were discussed with the patient.                        Return to normal activities tomorrow. Written                        discharge instructions were provided to the patient.                        - Resume previous diet.                        - Continue present medications.                        - Await pathology results.                        - Repeat colonoscopy in 3 years for surveillance.                        - Resume Eliquis (apixaban) at prior dose tomorrow. Procedure Code(s):     --- Professional ---                        35148, Colonoscopy, flexible; with removal of                        tumor(s), polyp(s), or other lesion(s) by snare                        technique                        --- Technical ---                        05248, Colonoscopy, flexible; with removal of                        tumor(s), polyp(s), or other lesion(s) by snare                        technique Diagnosis Code(s):     --- Professional ---                        Z12.11, Encounter for screening for malignant neoplasm                        of colon                        Z86.010, Personal history of colonic polyps                        D12.3, Benign neoplasm of transverse colon (hepatic                        flexure or splenic flexure)                        D12.2, Benign neoplasm of ascending colon                        D12.5, Benign neoplasm of sigmoid colon                        K64.0, First degree hemorrhoids                        --- Technical ---                        Z12.11, Encounter for screening for malignant neoplasm                        of colon                        Z86.010, Personal history of colonic polyps                        D12.3, Benign neoplasm of transverse colon (hepatic                        flexure or splenic flexure)                        D12.2, Benign neoplasm of ascending colon                         D12.5, Benign neoplasm of sigmoid colon                        K64.0, First degree hemorrhoids CPT copyright  American Medical Association. All rights reserved. The codes documented in this report are preliminary and upon  review may be revised to meet current compliance requirements. Attending Participation:      I personally performed the entire procedure. Aakash Clark MD 2023 1:09:51 PM This report has been signed electronically. Number of Addenda: 0 Note Initiated On: 2023 12:00 PM Scope Withdrawal Time 0 hours 11 minutes 17 seconds Total Procedure Duration Time 0 hours 13 minutes 35 seconds       No family history on file.  Social History     Socioeconomic History    Marital status: Single   Tobacco Use    Smoking status: Some Days     Current packs/day: 0.00     Types: Cigarettes, Cigars     Last attempt to quit:      Years since quittin.4    Smokeless tobacco: Never   Vaping Use    Vaping status: Never Used   Substance and Sexual Activity    Alcohol use: Yes     Comment: occassionally    Drug use: Never     Past Medical History:   Diagnosis Date    Disorder of the skin and subcutaneous tissue, unspecified 2017    Skin lesion of back    Essential (primary) hypertension 2013    Benign essential hypertension    Pain in unspecified foot 2015    Foot pain    Personal history of other diseases of male genital organs     History of benign prostatic hypertrophy    Personal history of other diseases of the circulatory system 2018    History of cardiomyopathy    Personal history of other endocrine, nutritional and metabolic disease     History of type 2 diabetes mellitus    Personal history of other endocrine, nutritional and metabolic disease     History of hyperlipidemia    Personal history of other specified conditions     History of headache    Strain of muscle, fascia and tendon of abdomen, initial encounter 2016    Abdominal muscle  strain    Unspecified asthma, uncomplicated ([facility]) 01/20/2022    Asthma, allergic    Unspecified visual disturbance 05/22/2015    Vision changes     No past surgical history on file.    Charting was completed using voice recognition technology and may include unintended errors.

## 2025-06-17 NOTE — PROGRESS NOTES
"    History Of Present Illness:      This is a 75-year-old male with paroxysmal atrial fibrillation.  The patient was last seen in the office in June 2024.  He has been on Eliquis for anticoagulation and has had no bleeding problems.  No new cardiac complaints at this time.  No recent episodes of atrial fibrillation.    Review of Systems  Other review of systems negative  Last Recorded Vitals:      10/11/2023     1:09 PM 11/29/2023     4:03 PM 1/15/2024     1:22 PM 6/5/2024     1:57 PM 6/17/2024    11:35 AM 12/9/2024     1:57 PM 6/9/2025     2:01 PM   Vitals   Systolic 98 163 134 130 120 131 131   Diastolic 58 74 80 73 80 72 78   BP Location    Left arm Right arm Left arm Left arm   Heart Rate 74 65 61 58 73 61 61   Temp 36.3 °C (97.3 °F)         Height 1.753 m (5' 9\") 1.753 m (5' 9\") 1.753 m (5' 9\") 1.753 m (5' 9\") 1.753 m (5' 9\") 1.753 m (5' 9\") 1.753 m (5' 9\")   Weight (lb) 191 197.8 190 189 179 193 188.2   BMI 28.21 kg/m2 29.21 kg/m2 28.06 kg/m2 27.91 kg/m2 26.43 kg/m2 28.5 kg/m2 27.79 kg/m2   BSA (m2) 2.05 m2 2.09 m2 2.05 m2 2.04 m2 1.99 m2 2.06 m2 2.04 m2   Visit Report Report Report Report Report Report Report Report     Allergies:  Patient has no known allergies.  Outpatient Medications:  Current Outpatient Medications   Medication Instructions    atorvastatin (LIPITOR) 20 mg, oral, Daily, as directed    blood sugar diagnostic (OneTouch Ultra Test) strip USE TO CHECK BLOOD SUGAR DAILY    chlorthalidone (HYGROTON) 12.5 mg, oral, Daily, Take 1/2 tablet once daily    Eliquis 5 mg, oral, 2 times daily    finasteride (PROSCAR) 5 mg, oral, Daily    Jardiance 25 mg, oral, Daily    losartan (COZAAR) 100 mg, oral, Daily    metFORMIN (GLUCOPHAGE) 1,000 mg, oral, 2 times daily (morning and late afternoon)    potassium chloride ER (Micro-K) 8 mEq ER capsule 8 mEq, oral, 2 times daily       Physical Exam:    General Appearance:  Alert, oriented, no distress  Skin:  Warm and dry  Head and Neck:  No elevation of JVP, no " "carotid bruits  Cardiac Exam:  Rhythm is regular, S1 and S2 are normal, grade 1/6 crescendo decrescendo murmur at the right upper sternal border  Lungs:  Clear to auscultation  Extremities:  no edema  Neurologic:  No focal deficits  Psychiatric:  Appropriate mood and behavior    Lab Results:    CMP:  Recent Labs     10/17/22  1305 08/31/22  1409     --    K 3.8  --    CL 97*  --    CO2 34*  --    ANIONGAP 13  --    BUN 21 24*   CREATININE 0.93 0.99   No results for input(s): \"ALBUMIN\", \"ALKPHOS\", \"ALT\", \"AST\", \"BILITOT\", \"LIPASE\" in the last 91854 hours.    No lab exists for component: \"CA\"  CBC:No results for input(s): \"WBC\", \"HGB\", \"HCT\", \"PLT\", \"MCV\" in the last 45466 hours.  COAG: No results for input(s): \"PTT\", \"INR\", \"HAUF\", \"DDIMERVTE\", \"HAPTOGLOBIN\", \"FIBRINOGEN\" in the last 35275 hours.  Cardiology Tests (personally reviewed):    I have personally review the diagnostic cardiac testing and my interpretation is as follows:     EKG:  NSR  Echocardiogram July 2023: Normal left ventricular function, peak instantaneous gradient across aortic valve 27.5 mmHg, mean gradient 15 mmHg    Assessment/Plan   Problem List Items Addressed This Visit           ICD-10-CM    Hyperlipidemia E78.5    The most recent LDL cholesterol is 51 mg/dL.  Continue atorvastatin at the same dosage.         Paroxysmal atrial fibrillation (Multi) I48.0    Jett is in sinus rhythm today and doing well on Eliquis.  He has had no hemorrhagic issues.  Continue Eliquis 5 mg twice daily for stroke risk reduction.  EP follow-up in 1 year.         Relevant Medications    apixaban (Eliquis) 5 mg tablet    Benign essential hypertension - Primary I10    Blood pressure is under good control at this time on losartan and chlorthalidone.         Nonrheumatic aortic (valve) stenosis I35.0    The aortic valve stenosis is mild based on the echocardiogram I reviewed from June 2025.  We will plan for another echocardiogram in 2 years.          Other Visit " Diagnoses         Codes      Atrial fibrillation, unspecified type (Multi)     I48.91    Relevant Orders    ECG 12 lead (Clinic Performed) (Completed)            James C Ramicone, DO

## 2025-06-18 ENCOUNTER — APPOINTMENT (OUTPATIENT)
Dept: CARDIOLOGY | Facility: CLINIC | Age: 75
End: 2025-06-18
Payer: MEDICARE

## 2025-06-18 VITALS
HEIGHT: 69 IN | OXYGEN SATURATION: 95 % | SYSTOLIC BLOOD PRESSURE: 120 MMHG | WEIGHT: 186 LBS | BODY MASS INDEX: 27.55 KG/M2 | HEART RATE: 71 BPM | DIASTOLIC BLOOD PRESSURE: 70 MMHG

## 2025-06-18 DIAGNOSIS — E78.2 MIXED HYPERLIPIDEMIA: ICD-10-CM

## 2025-06-18 DIAGNOSIS — I10 BENIGN ESSENTIAL HYPERTENSION: Primary | ICD-10-CM

## 2025-06-18 DIAGNOSIS — I48.91 ATRIAL FIBRILLATION, UNSPECIFIED TYPE (MULTI): ICD-10-CM

## 2025-06-18 DIAGNOSIS — I35.0 NONRHEUMATIC AORTIC (VALVE) STENOSIS: ICD-10-CM

## 2025-06-18 DIAGNOSIS — I48.0 PAROXYSMAL ATRIAL FIBRILLATION (MULTI): ICD-10-CM

## 2025-06-18 PROCEDURE — 3078F DIAST BP <80 MM HG: CPT | Performed by: INTERNAL MEDICINE

## 2025-06-18 PROCEDURE — 93000 ELECTROCARDIOGRAM COMPLETE: CPT | Performed by: INTERNAL MEDICINE

## 2025-06-18 PROCEDURE — 99214 OFFICE O/P EST MOD 30 MIN: CPT | Performed by: INTERNAL MEDICINE

## 2025-06-18 PROCEDURE — 1159F MED LIST DOCD IN RCRD: CPT | Performed by: INTERNAL MEDICINE

## 2025-06-18 PROCEDURE — 3074F SYST BP LT 130 MM HG: CPT | Performed by: INTERNAL MEDICINE

## 2025-06-18 NOTE — ASSESSMENT & PLAN NOTE
The aortic valve stenosis is mild based on the echocardiogram I reviewed from June 2025.  We will plan for another echocardiogram in 2 years.

## 2025-06-18 NOTE — ASSESSMENT & PLAN NOTE
Jett is in sinus rhythm today and doing well on Eliquis.  He has had no hemorrhagic issues.  Continue Eliquis 5 mg twice daily for stroke risk reduction.  EP follow-up in 1 year.

## 2025-07-16 ENCOUNTER — TELEPHONE (OUTPATIENT)
Dept: PRIMARY CARE | Facility: CLINIC | Age: 75
End: 2025-07-16
Payer: MEDICARE

## 2025-07-16 NOTE — TELEPHONE ENCOUNTER
Patient's insurance no longer will cover One Touch Ultra Strips and reader after 8/3.  Patient will need a new prescription for strips and a reader prior to 8/3 when insurance stops covering these items.   Here are some options that his insurance will cover:  Contour Blue plus  Contour Plus test strips  Accu Chek Guide meter and strips    [facility]

## 2025-07-17 ENCOUNTER — TELEPHONE (OUTPATIENT)
Dept: GASTROENTEROLOGY | Facility: CLINIC | Age: 75
End: 2025-07-17
Payer: MEDICARE

## 2025-07-17 DIAGNOSIS — E11.69 TYPE 2 DIABETES MELLITUS WITH OTHER SPECIFIED COMPLICATION, WITHOUT LONG-TERM CURRENT USE OF INSULIN: ICD-10-CM

## 2025-07-17 RX ORDER — DEXTROSE 4 G
TABLET,CHEWABLE ORAL
Qty: 1 EACH | Refills: 1 | Status: CANCELLED | OUTPATIENT
Start: 2025-07-17

## 2025-07-17 RX ORDER — BLOOD SUGAR DIAGNOSTIC
STRIP MISCELLANEOUS
Qty: 300 EACH | Refills: 3 | Status: CANCELLED | OUTPATIENT
Start: 2025-07-17

## 2025-07-17 NOTE — TELEPHONE ENCOUNTER
I called patient at 7:36am, I was able to leave a voicemail this morning, I left my direct number, and the front desks number. I let patient know he would need to make an OV appt for a colon consult before we could schedule him for a colonoscopy. I sent a [employer] message as well.

## 2025-07-17 NOTE — TELEPHONE ENCOUNTER
Patient has been called numerous time in attempts to schedule but he does not have voicemail, so I have been unable to leave a message. Patient called my voicemail yesterday after my scheduled hours, leaving a voicemail with complaints that he has not received any calls. He does note in his voicemail to me, that he does not have voicemail and or use it. To schedule him it has to be a live call. Which has been attempted.     NR attempted to call tyo make an office visit as he will need one before we can schedule him due to age and morbidities.  I will continmue to reach out to him and also send a [employer] message and Letter.

## 2025-07-18 NOTE — TELEPHONE ENCOUNTER
Spoke with patient, he will call me on Monday so he can make an appointment for an office visit with Jessenia for a colon consult.

## 2025-07-21 ENCOUNTER — TELEPHONE (OUTPATIENT)
Dept: GASTROENTEROLOGY | Facility: CLINIC | Age: 75
End: 2025-07-21
Payer: MEDICARE

## 2025-07-21 DIAGNOSIS — E11.69 TYPE 2 DIABETES MELLITUS WITH OTHER SPECIFIED COMPLICATION, WITHOUT LONG-TERM CURRENT USE OF INSULIN: Primary | ICD-10-CM

## 2025-07-21 RX ORDER — DEXTROSE 4 G
TABLET,CHEWABLE ORAL
Qty: 1 EACH | Refills: 0 | Status: SHIPPED | OUTPATIENT
Start: 2025-07-21

## 2025-07-21 NOTE — TELEPHONE ENCOUNTER
Left a message letting the patient know that they can call the office to schedule a NPV for colonoscopy consult. Thank you.

## 2025-07-23 NOTE — PROGRESS NOTES
CC: Colon cancer screening    History of Present Illness:   Jett Shelton is a 75 y.o. male with a PMH of hypertension, atrial fibrillation on Eliquis, hyperlipidemia, type 2 diabetes, neuropathy  History of multiple colon polyps, biopsy with tubular adenoma.    Today here for colon cancer screening  Denies abdominal pain, nausea, vomiting, heartburn, acid reflux, Dysphagia.   Denies constipation, diarrhea,  BM once a day,   soft /solid/brown.#  on [place] stool chart.  One time  felt lump inside of anus, noticing intermittent rectal discomfort  with  longer siting - specially with traveling, last time was last week when travelling. Goes away very quickly,  rarely takes tylenol.  Denies any pain with defecation.   Denies hematochezia, melena,   Denies weight loss or appetite change.    on Eliquis, Denies use of NSAIDs, aspirin.  Denies any recent change in medication or use of new medication.    Denies family history of colon cancer, celiac disease, inflammatory bowel disease,  Denies alcohol use, cigarette smoking-small eery day, denies illicit drug use.    H/o Abdominal surgery- denies   Review of Systems  ROS Negative unless otherwise stated above.      Past Medical/Surgical History  Medical History[1]   Surgical History[2]     Social History   reports that he has been smoking cigarettes and cigars. He has never used smokeless tobacco. He reports current alcohol use. He reports that he does not use drugs.     Family History  family history is not on file.     Allergies  RX Allergies[3]    Medications  Current Outpatient Medications   Medication Instructions    apixaban (ELIQUIS) 5 mg, oral, 2 times daily    atorvastatin (LIPITOR) 20 mg, oral, Daily, as directed    blood sugar diagnostic (OneTouch Ultra Test) strip USE TO CHECK BLOOD SUGAR DAILY    blood sugar diagnostic 1 strip, miscellaneous, 3 times daily before meals    blood-glucose meter (Contour Next Meter) misc Use as directed    chlorthalidone (HYGROTON)  "12.5 mg, oral, Daily, Take 1/2 tablet once daily    finasteride (PROSCAR) 5 mg, oral, Daily    Jardiance 25 mg, oral, Daily    losartan (COZAAR) 100 mg, oral, Daily    metFORMIN (GLUCOPHAGE) 1,000 mg, oral, 2 times daily (morning and late afternoon)    potassium chloride ER (Micro-K) 8 mEq ER capsule 8 mEq, oral, 2 times daily        Objective   Visit Vitals  /83   Pulse 80      Physical Exam   General: A&Ox3, NAD.  HEENT: No scleral icterus, no conjunctival pallor, normocephalic, atraumatic  Neck:  atraumatic, trachea midline, no JVD   CV: RRR. Positive S1/S2.   Resp: CTA bilaterally. No wheezing, rhonchi or rales.   GI: BSx4. no distension, Soft, non-tender to palpation, no rebound tenderness, no guarding, no rigidity, no discernible ascites   Extremities: no lower extremity edema.  Neuro: No focal deficits. no asterixis  Psych: pleasant and cooperative.  Skin:  Warm and dry, no jaundice      No results found for: \"WBC\", \"HGB\", \"HCT\", \"MCV\", \"PLT\"    Chemistry    Lab Results   Component Value Date/Time     10/17/2022 1305    K 3.8 10/17/2022 1305    CL 97 (L) 10/17/2022 1305    CO2 34 (H) 10/17/2022 1305    BUN 21 10/17/2022 1305    CREATININE 0.93 10/17/2022 1305    Lab Results   Component Value Date/Time    CALCIUM 10.5 10/17/2022 1305        Colonoscopy: 9/25/2023   Impression:            - Three 5 to 7 mm polyps in the transverse colon and                          in the ascending colon, removed with a cold snare.                          Resected and retrieved.                         - Two 5 to 7 mm polyps in the sigmoid colon, removed                          with a cold snare. Resected and retrieved.                         - Non-bleeding internal hemorrhoids.                         - The examined portion of the ileum was normal.  Repeat colonoscopy in 3 years for surveillance.   Biopsy with adenomatous polyp,.  Colonoscopy: 5/28/2020  Impression: - One 5 mm polyp in the transverse colon, " removed with                          a cold snare. Resected and retrieved.                         - One 7 mm polyp in the transverse colon.                         - One 10 mm polyp in the rectum, removed with a hot                          snare. Resected and retrieved.                         - Non-bleeding internal hemorrhoids.  Colonoscopy: 2/18/2015 Impression:            - Decreased sphincter tone found on digital rectal                          exam.                         - The entire examined colon is normal.                         - Non-bleeding external hemorrhoids.    Colonoscopy:  8/2/2011                           Impression:            - The examined portion of the ileum was normal.                         - External hemorrhoids.                         - One 3 mm polyp in the descending colon. Resected and                          retrieved.                         - Two 10 to 12 mm polyps in the sigmoid colon.                          Resected and retrieved.                         - Two 6 to 15 mm polyps in the rectum. Resected and                          retrieved.  Assessment & Plan  History of adenomatous polyp of colon  Colonoscopy in 2023 with adenomatous polyp and internal hemorrhoids,  Orders:    Colonoscopy Screening; High Risk Patient; Future    sodium,potassium,mag sulfates (Suprep Bowel Prep Kit) 17.5-3.13-1.6 gram solution; Take 1 bottle by mouth 2 times a day for 1 day. Follow instructions given    Rectal discomfort  Intermittent with longer sitting.no other GI symptoms.  Colonoscopy in 2023 with adenomatous polyp and internal hemorrhoids,  further evaluation with colonoscopy with possible, hemorrhoids, mass or lesion.    Orders:    Colonoscopy Screening; High Risk Patient; Future    sodium,potassium,mag sulfates (Suprep Bowel Prep Kit) 17.5-3.13-1.6 gram solution; Take 1 bottle by mouth 2 times a day for 1 day. Follow instructions given       We discussed the risks associated  with a colonoscopy including the risk of bleeding, infection, perforation, internal hemorrhage, heart or lung complications.  Patient demonstrated understanding of the associated risks and willingness to proceed.        Follow-up 6 to 8 weeks after colonoscopy.    RUDY Arguelles-CNP    The note was created using voice recognition transcription software. Despite proofreading, unintentional typographical errors may be present. Please contact the GI office with any questions or concerns.          [1]   Past Medical History:  Diagnosis Date    Disorder of the skin and subcutaneous tissue, unspecified 11/14/2017    Skin lesion of back    Essential (primary) hypertension 12/04/2013    Benign essential hypertension    Pain in unspecified foot 05/22/2015    Foot pain    Personal history of other diseases of male genital organs     History of benign prostatic hypertrophy    Personal history of other diseases of the circulatory system 09/28/2018    History of cardiomyopathy    Personal history of other endocrine, nutritional and metabolic disease     History of type 2 diabetes mellitus    Personal history of other endocrine, nutritional and metabolic disease     History of hyperlipidemia    Personal history of other specified conditions     History of headache    Strain of muscle, fascia and tendon of abdomen, initial encounter 05/25/2016    Abdominal muscle strain    Unspecified asthma, uncomplicated ([facility]) 01/20/2022    Asthma, allergic    Unspecified visual disturbance 05/22/2015    Vision changes   [2] No past surgical history on file.  [3] No Known Allergies

## 2025-07-24 ENCOUNTER — TELEPHONE (OUTPATIENT)
Dept: GASTROENTEROLOGY | Facility: CLINIC | Age: 75
End: 2025-07-24

## 2025-07-24 ENCOUNTER — OFFICE VISIT (OUTPATIENT)
Dept: GASTROENTEROLOGY | Facility: CLINIC | Age: 75
End: 2025-07-24
Payer: MEDICARE

## 2025-07-24 VITALS
SYSTOLIC BLOOD PRESSURE: 132 MMHG | HEIGHT: 69 IN | BODY MASS INDEX: 27.85 KG/M2 | HEART RATE: 80 BPM | WEIGHT: 188 LBS | DIASTOLIC BLOOD PRESSURE: 83 MMHG

## 2025-07-24 DIAGNOSIS — Z86.0101 HISTORY OF ADENOMATOUS POLYP OF COLON: Primary | ICD-10-CM

## 2025-07-24 DIAGNOSIS — K62.89 RECTAL DISCOMFORT: ICD-10-CM

## 2025-07-24 PROCEDURE — 3079F DIAST BP 80-89 MM HG: CPT | Performed by: NURSE PRACTITIONER

## 2025-07-24 PROCEDURE — 99203 OFFICE O/P NEW LOW 30 MIN: CPT | Performed by: NURSE PRACTITIONER

## 2025-07-24 PROCEDURE — 3075F SYST BP GE 130 - 139MM HG: CPT | Performed by: NURSE PRACTITIONER

## 2025-07-24 PROCEDURE — 1159F MED LIST DOCD IN RCRD: CPT | Performed by: NURSE PRACTITIONER

## 2025-07-24 RX ORDER — SODIUM, POTASSIUM,MAG SULFATES 17.5-3.13G
1 SOLUTION, RECONSTITUTED, ORAL ORAL 2 TIMES DAILY
Qty: 1 EACH | Refills: 0 | Status: SHIPPED | OUTPATIENT
Start: 2025-07-24 | End: 2025-07-25

## 2025-07-24 NOTE — PATIENT INSTRUCTIONS
Take MiraLAX one dose every day for 5 days before the procedure, to make sure that your prep is clean.  To prevent constipation, take high to adequate fibers in diet, stay hydrated, and stay physically active.  you can mix your prep with Crystal light, refrigerate for better taste, drink slowly, can use a straw.  we can send you some nausea medications as need if not able to tolerate the prep.  Please avoid smoking, vaping, alcohol, marijuana at least in the last 3 days before your procedure to decrease the risk of complications. If you have sleep apnea, compliance to CPAP machine can decrease the risk of complications as well.        Follow-up 6 to 8 weeks after colonoscopy.

## 2025-08-06 DIAGNOSIS — I10 BENIGN ESSENTIAL HYPERTENSION: ICD-10-CM

## 2025-08-06 RX ORDER — CHLORTHALIDONE 25 MG/1
TABLET ORAL
Qty: 45 TABLET | Refills: 3 | Status: SHIPPED | OUTPATIENT
Start: 2025-08-06

## 2025-12-10 ENCOUNTER — APPOINTMENT (OUTPATIENT)
Dept: PRIMARY CARE | Facility: CLINIC | Age: 75
End: 2025-12-10
Payer: MEDICARE

## 2026-06-17 ENCOUNTER — APPOINTMENT (OUTPATIENT)
Dept: CARDIOLOGY | Facility: CLINIC | Age: 76
End: 2026-06-17
Payer: MEDICARE